# Patient Record
Sex: MALE | Race: WHITE | Employment: UNEMPLOYED | URBAN - METROPOLITAN AREA
[De-identification: names, ages, dates, MRNs, and addresses within clinical notes are randomized per-mention and may not be internally consistent; named-entity substitution may affect disease eponyms.]

---

## 2018-02-16 NOTE — TELEPHONE ENCOUNTER
APPT INFO    Date /Time: 3/7/18 10:20AM   Reason for Appt: Left leg stump pain   Ref Provider/Clinic: CARLI PEREZ/ [facility]   Are there internal records? Yes/No?  IF YES, list clinic names: NO   Are there outside records? Yes/No? YES - see below   Patient Contact (Y/N) & Call Details: NO - referral   Action: Faxed request      OUTSIDE RECORDS CHECKLIST     CLINIC NAME COMMENTS REC (x) IMG (x)   [facility]

## 2018-02-16 NOTE — TELEPHONE ENCOUNTER
Records Received From:  [facility]     DATE/EXAM/LOCATION  (specify location if different)   Office Notes: 1/19/18    [facility] notes: 8/20/17-9/7/17   Missing: - SHANELLE 9/3/17  - [facility] records/images

## 2018-03-07 ENCOUNTER — PRE VISIT (OUTPATIENT)
Dept: ANESTHESIOLOGY | Facility: CLINIC | Age: 58
End: 2018-03-07

## 2018-03-07 ENCOUNTER — OFFICE VISIT (OUTPATIENT)
Dept: ANESTHESIOLOGY | Facility: CLINIC | Age: 58
End: 2018-03-07
Payer: COMMERCIAL

## 2018-03-07 VITALS
SYSTOLIC BLOOD PRESSURE: 145 MMHG | BODY MASS INDEX: 28.88 KG/M2 | RESPIRATION RATE: 16 BRPM | HEIGHT: 74 IN | DIASTOLIC BLOOD PRESSURE: 94 MMHG | WEIGHT: 225 LBS | HEART RATE: 98 BPM

## 2018-03-07 DIAGNOSIS — M79.609 STUMP PAIN (H): Primary | ICD-10-CM

## 2018-03-07 DIAGNOSIS — T87.89 STUMP PAIN (H): Primary | ICD-10-CM

## 2018-03-07 RX ORDER — PREGABALIN 25 MG/1
25 CAPSULE ORAL 2 TIMES DAILY
Qty: 60 CAPSULE | Refills: 1 | Status: SHIPPED | OUTPATIENT
Start: 2018-03-07 | End: 2019-03-27

## 2018-03-07 ASSESSMENT — PAIN SCALES - GENERAL: PAINLEVEL: SEVERE PAIN (6)
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
## 2018-03-07 NOTE — MR AVS SNAPSHOT
After Visit Summary   3/7/2018    Jesus Presley    MRN: [id number]           Patient Information     Date Of Birth          1960        Visit Information        Provider Department      3/7/2018 10:20 AM Martine Townsend MD [facility] for Comprehensive Pain Management        Today's Diagnoses     Stump pain (H)    -  1      Care Instructions    1. Referral to orthopedics for evaluation of your stump.     2. Start taking lyrica 25mg twice daily following these instructions:    Week 1: 0mg in AM 25mg in PM  Week 2: 25mg in AM 25mg in PM    3. Continue taking cymbalta as prescribed.     4. Start taking ibuprofen 400mg every 6 hours as needed for pain.     Follow up: 3 months       To speak with a nurse, schedule/reschedule/cancel a clinic appointment, or request a medication refill call: [phone]     You can also reach us by [employer]: [email or web address]    For refills, please call on Monday, 1 week before your medication runs out. The doctors are not always in clinic, so this gives us time to get your prescriptions ready.  Please let us know the name of the medication you are requesting a refill of.                                     Follow-ups after your visit        Additional Services     ORTHOPEDICS ADULT REFERRAL       Evaluation of left BKA stump     Your provider has referred you to: [facility]: Orthopaedic Clinic [facility] [phone]   [email or web address]      Please be aware that coverage of these services is subject to the terms and limitations of your health insurance plan.  Call member services at your health plan with any benefit or coverage questions.      Please bring the following to your appointment:    >>   Any x-rays, CTs or MRIs which have been performed.  Contact the facility where they were done to arrange for  prior to your scheduled appointment.    >>   List of current medications   >>   This referral  "request   >>   Any documents/labs given to you for this referral                  Who to contact     Please call your clinic at [phone] to:    Ask questions about your health    Make or cancel appointments    Discuss your medicines    Learn about your test results    Speak to your doctor            Additional Information About Your Visit        [employer] Information     MyHeritage is an electronic gateway that provides easy, online access to your medical records. With MyHeritage, you can request a clinic appointment, read your test results, renew a prescription or communicate with your care team.     To sign up for MyHeritage visit the website at [email or web address]   You will be asked to enter the access code listed below, as well as some personal information. Please follow the directions to create your username and password.     Your access code is: [email or web address]  Expires: 2018  6:30 AM     Your access code will  in 90 days. If you need help or a new code, please contact your [facility] or call [phone] for assistance.        Care EveryWhere ID     This is your Care EveryWhere ID. This could be used by other organizations to access your [place] medical records  [id number]        Your Vitals Were     Pulse Respirations Height BMI (Body Mass Index)          98 16 1.88 m (6' 2\") 28.89 kg/m2         Blood Pressure from Last 3 Encounters:   18 (!) 145/94    Weight from Last 3 Encounters:   18 102.1 kg (225 lb)              We Performed the Following     ORTHOPEDICS ADULT REFERRAL          Today's Medication Changes          These changes are accurate as of 3/7/18 11:46 AM.  If you have any questions, ask your nurse or doctor.               Start taking these medicines.        Dose/Directions    pregabalin 25 MG capsule   Commonly known as:  LYRICA   Used for:  Stump pain (H)   Started by:  Martine Townsend MD        Dose:  25 mg   Take 1 capsule (25 mg) by " mouth 2 times daily   Quantity:  60 capsule   Refills:  1            Where to get your medicines      Some of these will need a paper prescription and others can be bought over the counter.  Ask your nurse if you have questions.     Bring a paper prescription for each of these medications     pregabalin 25 MG capsule                Primary Care Provider    None Specified       No primary provider on file.        Equal Access to Services     [place] : Hadii meredith ku hadayseo Soyohanaali, waaxda luqadaha, qaybta kaalmada hever, vincenzo demarcodominickwilliams [place]. So [facility] [phone].    ATENCIÓN: Si habla español, tiene a sweeney disposición servicios gratuitos de asistencia lingüística. [facility] [phone].    We comply with applicable federal civil rights laws and [place] laws. We do not discriminate on the basis of race, color, national origin, age, disability, sex, sexual orientation, or gender identity.            Thank you!     Thank you for choosing [facility] FOR COMPREHENSIVE PAIN MANAGEMENT  for your care. Our goal is always to provide you with excellent care. Hearing back from our patients is one way we can continue to improve our services. Please take a few minutes to complete the written survey that you may receive in the mail after your visit with us. Thank you!             Your Updated Medication List - Protect others around you: Learn how to safely use, store and throw away your medicines at www.disposemymeds.org.          This list is accurate as of 3/7/18 11:46 AM.  Always use your most recent med list.                   Brand Name Dispense Instructions for use Diagnosis    ADVIL PO      Take 600 mg by mouth every 8 hours as needed for moderate pain        CYMBALTA PO           EXCEDRIN MIGRAINE 250-250-65 MG per tablet   Generic drug:  aspirin-acetaminophen-caffeine      Take 1 tablet by mouth every 6 hours as needed for headaches        pregabalin 25 MG capsule    LYRICA    60  capsule    Take 1 capsule (25 mg) by mouth 2 times daily    Stump pain (H)

## 2018-03-07 NOTE — NURSING NOTE
AVS given and reviewed with pt.  Pt verbalized understanding and declined any questions.     Mona Gonzales, RN, BSN
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
## 2018-03-07 NOTE — PATIENT INSTRUCTIONS
1. Referral to orthopedics for evaluation of your stump.     2. Start taking lyrica 25mg twice daily following these instructions:    Week 1: 0mg in AM 25mg in PM  Week 2: 25mg in AM 25mg in PM    3. Continue taking cymbalta as prescribed.     4. Start taking ibuprofen 400mg every 6 hours as needed for pain.     Follow up: 3 months       To speak with a nurse, schedule/reschedule/cancel a clinic appointment, or request a medication refill call: [phone]     You can also reach us by [employer]: [email or web address]    For refills, please call on Monday, 1 week before your medication runs out. The doctors are not always in clinic, so this gives us time to get your prescriptions ready.  Please let us know the name of the medication you are requesting a refill of.

## 2018-03-08 ASSESSMENT — ENCOUNTER SYMPTOMS
GASTROINTESTINAL NEGATIVE: 1
MYALGIAS: 1
EYES NEGATIVE: 1
RESPIRATORY NEGATIVE: 1
TINGLING: 1
CONSTITUTIONAL NEGATIVE: 1
PSYCHIATRIC NEGATIVE: 1

## 2018-03-09 ENCOUNTER — TELEPHONE (OUTPATIENT)
Dept: ANESTHESIOLOGY | Facility: CLINIC | Age: 58
End: 2018-03-09

## 2018-03-09 NOTE — TELEPHONE ENCOUNTER
Pt updated with Dr. Townsend's injection recommendation and is agreeable with moving forward.  Pt asking about Dr. Townsend completing disability paperwork.  Pt advised to follow up with PCP or to call  to inquire about a provider to assist with this. Pt verbalized understanding.     Mona Gonzlaes, RN, BSN

## 2018-03-13 ENCOUNTER — CARE COORDINATION (OUTPATIENT)
Dept: ANESTHESIOLOGY | Facility: CLINIC | Age: 58
End: 2018-03-13

## 2018-03-13 DIAGNOSIS — T87.89 STUMP PAIN (H): Primary | ICD-10-CM

## 2018-03-13 DIAGNOSIS — M79.609 STUMP PAIN (H): Primary | ICD-10-CM

## 2018-03-14 ENCOUNTER — TELEPHONE (OUTPATIENT)
Dept: ANESTHESIOLOGY | Facility: CLINIC | Age: 58
End: 2018-03-14

## 2018-03-14 NOTE — TELEPHONE ENCOUNTER
I called Jesus to schedule his procedure.     I have him scheduled for his procedure with Dr. Townsend on 4/6/22018, follow up with Etelvina on 4/20/2018.     Jesus seemed confused about the details of the procedure, he did not understand why he needed to have a . He also had questions about what the procedure was.     I let him know that a nurse would call him to clarify his questions.    DISCHARGE

## 2018-03-14 NOTE — TELEPHONE ENCOUNTER
----- Message from Mona Gonzales RN sent at 3/13/2018  1:03 PM CDT -----  April,    Please call this pt to assist with scheduling with Dr. Townsend.  Orders are in.    Left stump neuroma injection  [phone]    Thanks!

## 2018-04-06 ENCOUNTER — TELEPHONE (OUTPATIENT)
Dept: ANESTHESIOLOGY | Facility: CLINIC | Age: 58
End: 2018-04-06

## 2018-04-06 ENCOUNTER — SURGERY (OUTPATIENT)
Age: 58
End: 2018-04-06

## 2018-04-06 ENCOUNTER — HOSPITAL ENCOUNTER (OUTPATIENT)
Facility: AMBULATORY SURGERY CENTER | Age: 58
End: 2018-04-06
Attending: ANESTHESIOLOGY
Payer: COMMERCIAL

## 2018-04-06 VITALS
HEART RATE: 92 BPM | SYSTOLIC BLOOD PRESSURE: 154 MMHG | BODY MASS INDEX: 28.88 KG/M2 | DIASTOLIC BLOOD PRESSURE: 97 MMHG | RESPIRATION RATE: 16 BRPM | OXYGEN SATURATION: 96 % | HEIGHT: 74 IN | TEMPERATURE: 97.2 F | WEIGHT: 225 LBS

## 2018-04-06 DIAGNOSIS — T87.89 STUMP PAIN (H): ICD-10-CM

## 2018-04-06 DIAGNOSIS — M79.609 PAINFUL AMPUTATION STUMP (H): Primary | ICD-10-CM

## 2018-04-06 DIAGNOSIS — T87.89 PAINFUL AMPUTATION STUMP (H): Primary | ICD-10-CM

## 2018-04-06 DIAGNOSIS — M79.609 STUMP PAIN (H): ICD-10-CM

## 2018-04-06 RX ORDER — BUPIVACAINE HYDROCHLORIDE 5 MG/ML
INJECTION, SOLUTION PERINEURAL PRN
Status: DISCONTINUED | OUTPATIENT
Start: 2018-04-06 | End: 2018-04-06 | Stop reason: HOSPADM

## 2018-04-06 RX ORDER — TRIAMCINOLONE ACETONIDE 40 MG/ML
INJECTION, SUSPENSION INTRA-ARTICULAR; INTRAMUSCULAR PRN
Status: DISCONTINUED | OUTPATIENT
Start: 2018-04-06 | End: 2018-04-06 | Stop reason: HOSPADM

## 2018-04-06 RX ORDER — LIDOCAINE HYDROCHLORIDE 10 MG/ML
INJECTION, SOLUTION EPIDURAL; INFILTRATION; INTRACAUDAL; PERINEURAL PRN
Status: DISCONTINUED | OUTPATIENT
Start: 2018-04-06 | End: 2018-04-06 | Stop reason: HOSPADM

## 2018-04-06 RX ADMIN — LIDOCAINE HYDROCHLORIDE 10 ML: 10 INJECTION, SOLUTION EPIDURAL; INFILTRATION; INTRACAUDAL; PERINEURAL at 11:50

## 2018-04-06 RX ADMIN — TRIAMCINOLONE ACETONIDE 40 MG: 40 INJECTION, SUSPENSION INTRA-ARTICULAR; INTRAMUSCULAR at 11:51

## 2018-04-06 RX ADMIN — BUPIVACAINE HYDROCHLORIDE 10 ML: 5 INJECTION, SOLUTION PERINEURAL at 11:49

## 2018-04-06 NOTE — TELEPHONE ENCOUNTER
[facility]    Phone Message    May a detailed message be left on voicemail: yes    Reason for Call: Other: FU what was discussed at appt.      Action Taken: Message routed to:  [facility] Clinic

## 2018-04-06 NOTE — DISCHARGE INSTRUCTIONS
Home Care Instructions after a Lower Extremity nerve block    In a lower extremity nerve block, local anesthetic or  numbing  medication is injected around the nerves to anesthetize or  numb  the leg or foot. When used for chronic pain, it is hoped that this procedure will interrupt the nerve pathways and provide long term pain relief. If your lower extremity is numb, you should be careful since it is possible to injure yourself without being aware of it. If your leg or foot is numb, you should:    Avoid striking or bumping your leg/foot    Avoid extreme hot or cold    Activity  -You may resume most normal activity levels with the exception of strenuous activity. It is important for us to know if your pain with normal activity is relieved after this injection.  -DO NOT shower for 24 hours  -DO NOT remove bandaid for 24 hours    Pain  -You may experience soreness at the injection site for one or two days  -You may use an ice pack for 20 minutes every 2 hours for the first 24 hours  -You may use a heating pad after the first 24 hours  -You may use Tylenol (acetaminophen) every 4 hours or other pain medicines as     directed by your physician      DID YOU RECEIVE SEDATION TODAY?  No    If you received sedation please follow these additional safety measures.  Sedation medicine, if given, may remain active for many hours. It is important for the next 24 hours that you do not:  -Drive a car  -Operate machines or power tools  -Consume alcohol, including beer  -Sign any important papers or legal documents    DID YOU RECEIVE STEROIDS TODAY?  Yes    Common side effects of steroids:  Not everyone will experience corticosteroid side effects. If side effects are experienced, they will gradually subside in the 7-10 day period following an injection. Most common side effects include:  -Flushed face and/or chest  -Feeling of warmth, particularly in the face but could be an overall feeling of warmth  -Increased blood sugar in  diabetic patients  -Menstrual irregularities my occur. If taking hormone-based birth control an alternate method of birth control is recommended  -Sleep disturbances and/or mood swings are possible  -Leg cramps      PLEASE KEEP TRACK OF YOUR SYMPTOMS AND NOTE YOUR IMPROVEMENT FOR YOUR DOCTOR.     Please contact us if you have:  -Severe pain  -Fever more than 101.5 degrees Fahrenheit  -Signs of infection at the injection site (redness, swelling, or drainage)    If you have questions, please contact our office at [phone] between the hours of 7:00 am and 3:00 pm Monday through Friday. After office hours you can contact the on call provider by dialing [phone]. If you need immediate attention, we recommend that you go to a hospital emergency room or dial 149.

## 2018-04-06 NOTE — IP AVS SNAPSHOT
[facility]    [address] [place] [id number]    Phone:  [phone]    Fax:  [phone]                                       After Visit Summary   4/6/2018    Jesus Presley    MRN: [id number]           After Visit Summary Signature Page     I have received my discharge instructions, and my questions have been answered. I have discussed any challenges I see with this plan with the nurse or doctor.    ..........................................................................................................................................  Patient/Patient Representative Signature      ..........................................................................................................................................  Patient Representative Print Name and Relationship to Patient    ..................................................               ................................................  Date                                            Time    ..........................................................................................................................................  Reviewed by Signature/Title    ...................................................              ..............................................  Date                                                            Time
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
## 2018-04-06 NOTE — OR NURSING
"Patient initially stated pain was gone when stood up from procedure table. On discharge, states he rates the pain 5/10. States the pain number is the same as before the procedure, but the pain is different; \"it's hurting where he put the needles in\".  "

## 2018-04-06 NOTE — IP AVS SNAPSHOT
MRN:[id number]                      After Visit Summary   4/6/2018    Jesus Presley    MRN: [id number]           Thank you!     Thank you for choosing [facility] for your care. Our goal is always to provide you with excellent care. Hearing back from our patients is one way we can continue to improve our services. Please take a few minutes to complete the written survey that you may receive in the mail after you visit with us. Thank you!        Patient Information     Date Of Birth          1960        About your hospital stay     You were admitted on:  April 6, 2018 You last received care in the:  [facility]    You were discharged on:  April 6, 2018       Who to Call     For medical emergencies, please call 911.  For non-urgent questions about your medical care, please call your primary care provider or clinic, None  For questions related to your surgery, please call your surgery clinic        Attending Provider     Provider Martine Lopes MD Anesthesiology       Primary Care Provider    None Specified      Your next 10 appointments already scheduled     Apr 20, 2018 10:20 AM CDT   (Arrive by 10:05 AM)   Return Visit with ZELALEM Rodriguez [facility] ([facility])    [address] [place] [id number] [phone]              Further instructions from your care team       Home Care Instructions after a Lower Extremity nerve block    In a lower extremity nerve block, local anesthetic or  numbing  medication is injected around the nerves to anesthetize or  numb  the leg or foot. When used for chronic pain, it is hoped that this procedure will interrupt the nerve pathways and provide long term pain relief. If your lower extremity is numb, you should be careful since it is possible to injure yourself without being aware of it. If your leg or foot is numb,  you should:    Avoid striking or bumping your leg/foot    Avoid extreme hot or cold    Activity  -You may resume most normal activity levels with the exception of strenuous activity. It is important for us to know if your pain with normal activity is relieved after this injection.  -DO NOT shower for 24 hours  -DO NOT remove bandaid for 24 hours    Pain  -You may experience soreness at the injection site for one or two days  -You may use an ice pack for 20 minutes every 2 hours for the first 24 hours  -You may use a heating pad after the first 24 hours  -You may use Tylenol (acetaminophen) every 4 hours or other pain medicines as     directed by your physician      DID YOU RECEIVE SEDATION TODAY?  No    If you received sedation please follow these additional safety measures.  Sedation medicine, if given, may remain active for many hours. It is important for the next 24 hours that you do not:  -Drive a car  -Operate machines or power tools  -Consume alcohol, including beer  -Sign any important papers or legal documents    DID YOU RECEIVE STEROIDS TODAY?  Yes    Common side effects of steroids:  Not everyone will experience corticosteroid side effects. If side effects are experienced, they will gradually subside in the 7-10 day period following an injection. Most common side effects include:  -Flushed face and/or chest  -Feeling of warmth, particularly in the face but could be an overall feeling of warmth  -Increased blood sugar in diabetic patients  -Menstrual irregularities my occur. If taking hormone-based birth control an alternate method of birth control is recommended  -Sleep disturbances and/or mood swings are possible  -Leg cramps      PLEASE KEEP TRACK OF YOUR SYMPTOMS AND NOTE YOUR IMPROVEMENT FOR YOUR DOCTOR.     Please contact us if you have:  -Severe pain  -Fever more than 101.5 degrees Fahrenheit  -Signs of infection at the injection site (redness, swelling, or drainage)    If you have questions, please  "contact our office at [phone] between the hours of 7:00 am and 3:00 pm Monday through Friday. After office hours you can contact the on call provider by dialing [phone]. If you need immediate attention, we recommend that you go to a hospital emergency room or dial 911.          Pending Results     No orders found from 2018 to 2018.            Admission Information     Date & Time Provider Department Dept. Phone    2018 Martine Townsend MD [facility] [phone]      Your Vitals Were     Blood Pressure Pulse Temperature Respirations Height Weight    143/90 92 97.2  F (36.2  C) (Temporal) 16 1.88 m (6' 2\") 102.1 kg (225 lb)    Pulse Oximetry BMI (Body Mass Index)                96% 28.89 kg/m2          [employer] Information     [employer] is an electronic gateway that provides easy, online access to your medical records. With [employer], you can request a clinic appointment, read your test results, renew a prescription or communicate with your care team.     To sign up for [employer] visit the website at [email or web address]   You will be asked to enter the access code listed below, as well as some personal information. Please follow the directions to create your username and password.     Your access code is: [email or web address]  Expires: 2018  7:30 AM     Your access code will  in 90 days. If you need help or a new code, please contact your [facility] or call [phone] for assistance.        Care EveryWhere ID     This is your Care EveryWhere ID. This could be used by other organizations to access your [place] medical records  [id number]        Equal Access to Services     PAUL GROVE AH: Hadkd Mary, wajohnda luqadaha, qaybta kaalmada hever, vincenzo browne. So [facility] [phone].    ATENCIÓN: Si habla español, tiene a sweeney disposición servicios gratuitos de asistencia lingüística. Llame " al [phone].    We comply with applicable federal civil rights laws and [place] laws. We do not discriminate on the basis of race, color, national origin, age, disability, sex, sexual orientation, or gender identity.               Review of your medicines      UNREVIEWED medicines. Ask your doctor about these medicines        Dose / Directions    ADVIL PO        Dose:  600 mg   Take 600 mg by mouth every 8 hours as needed for moderate pain   Refills:  0       CYMBALTA PO   Indication:  Patient doesn't know dosage.        Refills:  0       EXCEDRIN MIGRAINE 250-250-65 MG per tablet   Generic drug:  aspirin-acetaminophen-caffeine        Dose:  1 tablet   Take 1 tablet by mouth every 6 hours as needed for headaches   Refills:  0       pregabalin 25 MG capsule   Commonly known as:  LYRICA   Used for:  Stump pain (H)        Dose:  25 mg   Take 1 capsule (25 mg) by mouth 2 times daily   Quantity:  60 capsule   Refills:  1                Protect others around you: Learn how to safely use, store and throw away your medicines at www.disposemymeds.org.             Medication List: This is a list of all your medications and when to take them. Check marks below indicate your daily home schedule. Keep this list as a reference.      Medications           Morning Afternoon Evening Bedtime As Needed    ADVIL PO   Take 600 mg by mouth every 8 hours as needed for moderate pain                                CYMBALTA PO                                EXCEDRIN MIGRAINE 250-250-65 MG per tablet   Take 1 tablet by mouth every 6 hours as needed for headaches   Generic drug:  aspirin-acetaminophen-caffeine                                pregabalin 25 MG capsule   Commonly known as:  LYRICA   Take 1 capsule (25 mg) by mouth 2 times daily

## 2018-04-09 NOTE — TELEPHONE ENCOUNTER
Call back to pt.  Verbal orders given by Dr. Townsend for TENs unit.  Pt notified that DME order will pt mailed to him.  Pt instructed that order may be taken to any medical supply store to obtain the device.  Pt verbalized understanding.     Mona Gonzales, RN, BSN

## 2018-04-09 NOTE — TELEPHONE ENCOUNTER
[facility] Call Center    Phone Message    May a detailed message be left on voicemail: yes    Reason for Call: Other: Pt would like to discuss Rx for electric stimulator, and other F/U questions.     Action Taken: Message routed to:  [facility] ([facility] PAIN ADULT

## 2018-04-09 NOTE — TELEPHONE ENCOUNTER
[facility] Call Center    Phone Message    May a detailed message be left on voicemail: no    Reason for Call: Other: This is pt's 3rd call, he is trying to have Mona call him back in regards to the stimulator that Dr. Townsend ordered for him.  Please call the pt back     Action Taken: Message routed to:  [facility] Pain

## 2018-04-20 ENCOUNTER — OFFICE VISIT (OUTPATIENT)
Dept: ANESTHESIOLOGY | Facility: CLINIC | Age: 58
End: 2018-04-20
Payer: COMMERCIAL

## 2018-04-20 VITALS
SYSTOLIC BLOOD PRESSURE: 150 MMHG | WEIGHT: 225 LBS | DIASTOLIC BLOOD PRESSURE: 108 MMHG | RESPIRATION RATE: 16 BRPM | HEART RATE: 92 BPM | BODY MASS INDEX: 28.88 KG/M2 | HEIGHT: 74 IN

## 2018-04-20 DIAGNOSIS — M79.605 PAIN OF AMPUTATION STUMP OF LEFT LOWER EXTREMITY (H): ICD-10-CM

## 2018-04-20 DIAGNOSIS — M79.2 NEUROPATHIC PAIN OF LEFT LOWER EXTREMITY: Primary | ICD-10-CM

## 2018-04-20 DIAGNOSIS — T87.89 PAIN OF AMPUTATION STUMP OF LEFT LOWER EXTREMITY (H): ICD-10-CM

## 2018-04-20 ASSESSMENT — PAIN SCALES - GENERAL: PAINLEVEL: MILD PAIN (3)

## 2018-04-20 NOTE — MR AVS SNAPSHOT
After Visit Summary   4/20/2018    Jesus Presley    MRN: [id number]           Patient Information     Date Of Birth          1960        Visit Information        Provider Department      4/20/2018 10:20 AM Etelvina Presley APRN [facility] Comprehensive Pain Management        Care Instructions    1. Call to schedule your Ablation with Dr. Townsend.   We will send the prior authorization to your insurance company, and contact you if there are any issues.     Please call [phone] to make your procedure appointment.  Phones are answered Monday - Friday from 7:30 - 4:00pm.  Leave a voicemail with your name, birth date, and phone number if no one is available to take your call.     Your procedure: Radiofrequency ablation of the Neuroma.     On the day of the procedure  1. Arrive 1 hour earlier than your scheduled time, to the [facility]  Address: [address] [place] [id number]  2. Check in on the 5th floor for your procedure      You will need to follow up in clinic 4-6 weeks after your procedure.    If you must reschedule your procedure more than two times, you must follow up in clinic before rescheduling again.      Patient Pre-Procedure Instructions    CAUTION - FAILURE TO FOLLOW THESE PRE-PROCEDURE INSTRUCTIONS WILL RESULT IN YOUR PROCEDURE BEING RESCHEDULED.      Pregnancy  If you are pregnant, or think that you may be pregnant, please notify our staff. This may or may not affect the ability to perform the procedure.     You MUST have a  TO TAKE YOU HOME after your procedure. Transportation by Taxi or Para-transit must have a responsible adult accompany you home (other than the ). Travel by bus or light rail is not acceptable transportation. You must provide your 's full name and contact number at time of check in.   Fasting Protocol You may have NOTHING SOLID TO EAT FOR 8 HOURS prior to arrival at the procedure area.    Broth and candy are considered solid food and require an eight hour fast.   You may have CLEAR LIQUIDS UP TO 2 HOURS prior to arrival at the clinic.   Clear liquids include water, clear fruit juice (no pulp) carbonated beverages, ice, black coffee, black tea (no milk or cream), chewing gum (un-swallowed), and/or clear jello (no fruit or milk). No alcohol containing beverages.   Medications If you take any medications,  DO NOT STOP. Take your medications as usual the morning of your procedure with a sip of water AT LEAST 2 HOURS PRIOR TO ARRIVAL.    Antibiotics If you are currently taking antibiotics, you must complete the entire dose 7 days prior to your scheduled procedure. You must be clear of any signs or symptoms of infection. If you begin antibiotics, please contact our clinic for instructions.   Fever, Chills, or Rash If you experience a fever of higher than 100 degrees, chills, rash, or open wounds during the one week prior to your procedure, please call the clinic.         Medication Hold List  **Patients under Cardiology/Neurology care should consult their provider prior to the pain procedure to verify pre-procedure medication instructions. The information below contains general guidelines.**    Blood Thinners If you are taking daily ASPIRIN, PLAVIX, OR OTHER BLOOD THINNERS SUCH AS COUMADIN/WARFARIN, we will need your prescribing doctor to sign a release permitting you to stop these medications. Once approved by your prescribing doctor - STOP ALL BLOOD THINNERS BASED ON THE TIME TABLE BELOW PRIOR TO YOUR PROCEDURE. If you have been instructed to stop WARFARIN(COUMADIN), you must have an INR lab drawn the day before your procedure. . Your INR must be within normal limits before we can perform your injection. MEDICATIONS CAN BE RESTARTED AFTER YOUR PROCEDURE.    14 DAY HOLD  Ticlid (ticlopidine)    10 DAY HOLD  Effient (Prasugel)    3 DAY HOLD  Xarelto (rivaroxaban) 7 DAY HOLD  Anacin, Bufferin, Ecotrin,  Excedrin, Aggrenox (Aspirin)  Brilinta (ticagrelor)  Coumadin (Warfarin)  Pradexa (Dabigatran)  Elmiron (Pentosan)  Plavix (Clopidogrel Bisulfate)  Pletal (Cilostazol)    24 HOUR HOLD  Lovenox (enoxaparin)  Agrylin (Anagrelide)        Non-steroidal Anti-inflammatories (NSAIDs) DO NOT TAKE any non-steroidal anti-inflammatory medications (NSAIDs) listed on the table below. MEDICATIONS CAN BE RESTARTED AFTER YOUR PROCEDURE. Celebrex is OK to take and does not need to be discontinued.     Medications to stop:  3 DAY HOLD  Advil, Motrin (Ibuprofen)  Arthrotec (diciofenac sodium/misoprostol)  Clinoril (Sulindac)  Indocin (Indomethacin)  Lodine (Etodolac)  Toradol (Ketorolac)  Vicoprofen (Hydrocodone and Ibuprofen)  Voltaren (Diclotenac)    14 DAY HOLD  Daypro (Oxaprozin)  Feldene (Piroxicam)   7 DAY HOLD  Aleve (Naproxen sodium)  Darvon compound (contains aspirin)  Naprosyn (Naproxen)  Norgesic Forte (contains aspirin)  Mobic (Meloxicam)  Oruvall (Ketoprofen)  Percodan (contains aspirin)  Relafen (Nabumetone)  Salsalate  Trilisate  Vitamin E (more than 400 mg per day)  Any medication containing aspirin                To speak with a nurse, schedule/reschedule/cancel a clinic appointment, or request a medication refill call: [phone]     You can also reach us by DediServe: [email or web address]    For refills, please call on Monday, 1 week before your medication runs out. The doctors are not always in clinic, so this gives us time to get your prescriptions ready.  Please let us know the name of the medication you are requesting a refill of.                                     Follow-ups after your visit        Who to contact     Please call your clinic at [phone] to:    Ask questions about your health    Make or cancel appointments    Discuss your medicines    Learn about your test results    Speak to your doctor            Additional Information About Your Visit        GlobalServehart Information      "VtagOt is an electronic gateway that provides easy, online access to your medical records. With [employer], you can request a clinic appointment, read your test results, renew a prescription or communicate with your care team.     To sign up for [employer] visit the website at [email or web address]   You will be asked to enter the access code listed below, as well as some personal information. Please follow the directions to create your username and password.     Your access code is: [email or web address]  Expires: 2018  7:30 AM     Your access code will  in 90 days. If you need help or a new code, please contact your [facility] or call [phone] for assistance.        Care EveryWhere ID     This is your Care EveryWhere ID. This could be used by other organizations to access your [place] medical records  [id number]        Your Vitals Were     Pulse Respirations Height BMI (Body Mass Index)          92 16 1.88 m (6' 2\") 28.89 kg/m2         Blood Pressure from Last 3 Encounters:   18 (!) 150/108   18 (!) 154/97   18 (!) 145/94    Weight from Last 3 Encounters:   18 102.1 kg (225 lb)   18 102.1 kg (225 lb)   18 102.1 kg (225 lb)              Today, you had the following     No orders found for display       Primary Care Provider    None Specified       No primary provider on file.        Equal Access to Services     [facility] : Hadii meredith legero Solarry, waaxda luqadaha, qaybta kaalmada vincenzo kathleen . So [facility] [phone].    ATENCIÓN: Si habla español, tiene a sweeney disposición servicios gratuitos de asistencia lingüística. Llame al [phone].    We comply with applicable federal civil rights laws and [place] laws. We do not discriminate on the basis of race, color, national origin, age, disability, sex, sexual orientation, or gender identity.            Thank you!     Thank you for choosing [facility] " [facility]  for your care. Our goal is always to provide you with excellent care. Hearing back from our patients is one way we can continue to improve our services. Please take a few minutes to complete the written survey that you may receive in the mail after your visit with us. Thank you!             Your Updated Medication List - Protect others around you: Learn how to safely use, store and throw away your medicines at www.disposemymeds.org.          This list is accurate as of 4/20/18 11:02 AM.  Always use your most recent med list.                   Brand Name Dispense Instructions for use Diagnosis    ADVIL PO      Take 600 mg by mouth every 8 hours as needed for moderate pain        CYMBALTA PO           EXCEDRIN MIGRAINE 250-250-65 MG per tablet   Generic drug:  aspirin-acetaminophen-caffeine      Take 1 tablet by mouth every 6 hours as needed for headaches        order for DME     1 Device    Equipment being ordered: TENS    Painful amputation stump (H), Stump pain (H)       pregabalin 25 MG capsule    LYRICA    60 capsule    Take 1 capsule (25 mg) by mouth 2 times daily    Stump pain (H)

## 2018-04-20 NOTE — PROGRESS NOTES
Date of visit: 4/20/2018    Chief complaint:   Chief Complaint   Patient presents with     Pain Management     Follow up        Interval history:  Jesus Presley is a 57 year old male last seen by my colleague, Dr. Martine Townsend on 3/7/18 for stump pain at the site of his left BKA amputation. He underwent dx ultrasound-guided left BKA stump neuroma injections on 4/6/18, reporting definite pain relief. He has a significant difficulty quantifying this, but finally is able to communicate at least 50% relief. He would like to proceed with ablative procedure as previously discussed.   He states he is not taking his Lyrica regularly due to the cost; gabapentin caused him fatigue and somnolence.      Recommendations/plan at the last visit included:  1. Medications.      a.  Continue Cymbalta 30 mg nightly.  Dose may be increased to 60 mg nightly  B.  Pregabalin 25 mg nightly.  Dose may be increased to 25 mg twice daily if the patient tolerates well  C.  Start taking ibuprofen 400 mg every 6 hours as needed.     2. Interventional procedures:     We discussed with the patient about diagnostic ultrasound-guided left BKA stump neuroma injections, the patient agreed to pursue. We will schedule the patient for the injections  pending insurance approval.  The patient gets good pain relief with a diagnostic injections, we may proceed to radiofrequency ablation of the neuroma under ultrasound guidance.     3. Labs and imaging: None needed for pain management.      4. Rehab: Advised to perform home exercise using Evver videos  [email or web address].  The patient is also encouraged to stay active as tolerated.      5. Psychology: No current needs.     6. Integrated medicine: We discussed acupuncture therapy.  The patient is not interested      7. Disposition:  The patient is advised to call clinic for follow up appointment in 3 months or earlier clinically indicated. We will see the patient for above  "mentioned procedure.       Pain scores:  Pain intensity on average is 3 on a scale of 0-10.         Medications:  Current Outpatient Prescriptions   Medication Sig Dispense Refill     aspirin-acetaminophen-caffeine (EXCEDRIN MIGRAINE) 250-250-65 MG per tablet Take 1 tablet by mouth every 6 hours as needed for headaches       DULoxetine HCl (CYMBALTA PO)        Ibuprofen (ADVIL PO) Take 600 mg by mouth every 8 hours as needed for moderate pain       order for DME Equipment being ordered: TENS 1 Device 0     pregabalin (LYRICA) 25 MG capsule Take 1 capsule (25 mg) by mouth 2 times daily 60 capsule 1       Medical History: any changes in medical history since they were last seen? No    Review of Systems:  The 14 system ROS was reviewed from the intake questionnaire; results listed at end of note.     Physical Exam:  Blood pressure (!) 150/108, pulse 92, resp. rate 16, height 1.88 m (6' 2\"), weight 102.1 kg (225 lb).  General: NAD  Gait: Antalgic  MSK exam: Left below-knee amputation stump well-healed.  No redness swelling or open wound noted.    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    Plan:  1. Interventions:   -Order placed for radiofrequency ablation of left BKA stump neuroma; procedure explained in depth. To schedule with Dr. Townsend.   2. Follow up: Return to clinic 4-6 weeks following RFA procedure.   3. Medications:  -Advised patient of the importance of remaining on his Lyrica as prescribed; if this is cost-prohibitive, may need to look into alternate options.     Total time spent was 20 minutes, and more than 50% of face to face time was spent in counseling and/or coordination of care regarding plan of care.    ZELALEM Rodriguez, SAL-BC  Pain Management  Department of Interventional Pain Management and Anesthesiology   [facility]        "

## 2018-04-20 NOTE — PATIENT INSTRUCTIONS
1. Call to schedule your Ablation with Dr. Townsend.   We will send the prior authorization to your insurance company, and contact you if there are any issues.     Please call [phone] to make your procedure appointment.  Phones are answered Monday - Friday from 7:30 - 4:00pm.  Leave a voicemail with your name, birth date, and phone number if no one is available to take your call.     Your procedure: Radiofrequency ablation of the Neuroma.     On the day of the procedure  1. Arrive 1 hour earlier than your scheduled time, to the [facility]  Address: [address] [place] [address]  2. Check in on the 5th floor for your procedure      You will need to follow up in clinic 4-6 weeks after your procedure.    If you must reschedule your procedure more than two times, you must follow up in clinic before rescheduling again.      Patient Pre-Procedure Instructions    CAUTION - FAILURE TO FOLLOW THESE PRE-PROCEDURE INSTRUCTIONS WILL RESULT IN YOUR PROCEDURE BEING RESCHEDULED.      Pregnancy  If you are pregnant, or think that you may be pregnant, please notify our staff. This may or may not affect the ability to perform the procedure.     You MUST have a  TO TAKE YOU HOME after your procedure. Transportation by Taxi or Para-transit must have a responsible adult accompany you home (other than the ). Travel by bus or light rail is not acceptable transportation. You must provide your 's full name and contact number at time of check in.   Fasting Protocol You may have NOTHING SOLID TO EAT FOR 8 HOURS prior to arrival at the procedure area.   Broth and candy are considered solid food and require an eight hour fast.   You may have CLEAR LIQUIDS UP TO 2 HOURS prior to arrival at the clinic.   Clear liquids include water, clear fruit juice (no pulp) carbonated beverages, ice, black coffee, black tea (no milk or cream), chewing gum (un-swallowed), and/or clear jello (no fruit  or milk). No alcohol containing beverages.   Medications If you take any medications,  DO NOT STOP. Take your medications as usual the morning of your procedure with a sip of water AT LEAST 2 HOURS PRIOR TO ARRIVAL.    Antibiotics If you are currently taking antibiotics, you must complete the entire dose 7 days prior to your scheduled procedure. You must be clear of any signs or symptoms of infection. If you begin antibiotics, please contact our clinic for instructions.   Fever, Chills, or Rash If you experience a fever of higher than 100 degrees, chills, rash, or open wounds during the one week prior to your procedure, please call the clinic.         Medication Hold List  **Patients under Cardiology/Neurology care should consult their provider prior to the pain procedure to verify pre-procedure medication instructions. The information below contains general guidelines.**    Blood Thinners If you are taking daily ASPIRIN, PLAVIX, OR OTHER BLOOD THINNERS SUCH AS COUMADIN/WARFARIN, we will need your prescribing doctor to sign a release permitting you to stop these medications. Once approved by your prescribing doctor - STOP ALL BLOOD THINNERS BASED ON THE TIME TABLE BELOW PRIOR TO YOUR PROCEDURE. If you have been instructed to stop WARFARIN(COUMADIN), you must have an INR lab drawn the day before your procedure. . Your INR must be within normal limits before we can perform your injection. MEDICATIONS CAN BE RESTARTED AFTER YOUR PROCEDURE.    14 DAY HOLD  Ticlid (ticlopidine)    10 DAY HOLD  Effient (Prasugel)    3 DAY HOLD  Xarelto (rivaroxaban) 7 DAY HOLD  Anacin, Bufferin, Ecotrin, Excedrin, Aggrenox (Aspirin)  Brilinta (ticagrelor)  Coumadin (Warfarin)  Pradexa (Dabigatran)  Elmiron (Pentosan)  Plavix (Clopidogrel Bisulfate)  Pletal (Cilostazol)    24 HOUR HOLD  Lovenox (enoxaparin)  Agrylin (Anagrelide)        Non-steroidal Anti-inflammatories (NSAIDs) DO NOT TAKE any non-steroidal anti-inflammatory medications  (NSAIDs) listed on the table below. MEDICATIONS CAN BE RESTARTED AFTER YOUR PROCEDURE. Celebrex is OK to take and does not need to be discontinued.     Medications to stop:  3 DAY HOLD  Advil, Motrin (Ibuprofen)  Arthrotec (diciofenac sodium/misoprostol)  Clinoril (Sulindac)  Indocin (Indomethacin)  Lodine (Etodolac)  Toradol (Ketorolac)  Vicoprofen (Hydrocodone and Ibuprofen)  Voltaren (Diclotenac)    14 DAY HOLD  Daypro (Oxaprozin)  Feldene (Piroxicam)   7 DAY HOLD  Aleve (Naproxen sodium)  Darvon compound (contains aspirin)  Naprosyn (Naproxen)  Norgesic Forte (contains aspirin)  Mobic (Meloxicam)  Oruvall (Ketoprofen)  Percodan (contains aspirin)  Relafen (Nabumetone)  Salsalate  Trilisate  Vitamin E (more than 400 mg per day)  Any medication containing aspirin                To speak with a nurse, schedule/reschedule/cancel a clinic appointment, or request a medication refill call: [phone]     You can also reach us by [employer]: [email or web address]    For refills, please call on Monday, 1 week before your medication runs out. The doctors are not always in clinic, so this gives us time to get your prescriptions ready.  Please let us know the name of the medication you are requesting a refill of.

## 2018-04-20 NOTE — LETTER
4/20/2018       RE: Jesus Presley  [address] [place] [address]     Dear Colleague,    Thank you for referring your patient, Jesus Presley, to the [facility] COM[facility]ENSIVE PA[facility] MANAGEMENT at [facility]. Please see a copy of my visit note below.    Date of visit: 4/20/2018    Chief complaint:   Chief Complaint   Patient presents with     Pain Management     Follow up        Interval history:  Jesus Presley is a 57 year old male last seen by my colleague, Dr. Martine Townsend on 3/7/18 for stump pain at the site of his left BKA amputation. He underwent dx ultrasound-guided left BKA stump neuroma injections on 4/6/18, reporting definite pain relief. He has a significant difficulty quantifying this, but finally is able to communicate at least 50% relief. He would like to proceed with ablative procedure as previously discussed.   He states he is not taking his Lyrica regularly due to the cost; gabapentin caused him fatigue and somnolence.      Recommendations/plan at the last visit included:  1. Medications.      a.  Continue Cymbalta 30 mg nightly.  Dose may be increased to 60 mg nightly  B.  Pregabalin 25 mg nightly.  Dose may be increased to 25 mg twice daily if the patient tolerates well  C.  Start taking ibuprofen 400 mg every 6 hours as needed.     2. Interventional procedures:     We discussed with the patient about diagnostic ultrasound-guided left BKA stump neuroma injections, the patient agreed to pursue. We will schedule the patient for the injections  pending insurance approval.  The patient gets good pain relief with a diagnostic injections, we may proceed to radiofrequency ablation of the neuroma under ultrasound guidance.     3. Labs and imaging: None needed for pain management.      4. Rehab: Advised to perform home exercise using [employer]Hobo videos  [email or web address].  The patient is also encouraged to stay active as  "tolerated.      5. Psychology: No current needs.     6. Integrated medicine: We discussed acupuncture therapy.  The patient is not interested      7. Disposition:  The patient is advised to call clinic for follow up appointment in 3 months or earlier clinically indicated. We will see the patient for above mentioned procedure.       Pain scores:  Pain intensity on average is 3 on a scale of 0-10.         Medications:  Current Outpatient Prescriptions   Medication Sig Dispense Refill     aspirin-acetaminophen-caffeine (EXCEDRIN MIGRAINE) 250-250-65 MG per tablet Take 1 tablet by mouth every 6 hours as needed for headaches       DULoxetine HCl (CYMBALTA PO)        Ibuprofen (ADVIL PO) Take 600 mg by mouth every 8 hours as needed for moderate pain       order for DME Equipment being ordered: TENS 1 Device 0     pregabalin (LYRICA) 25 MG capsule Take 1 capsule (25 mg) by mouth 2 times daily 60 capsule 1       Medical History: any changes in medical history since they were last seen? No    Review of Systems:  The 14 system ROS was reviewed from the intake questionnaire; results listed at end of note.     Physical Exam:  Blood pressure (!) 150/108, pulse 92, resp. rate 16, height 1.88 m (6' 2\"), weight 102.1 kg (225 lb).  General: NAD  Gait: Antalgic  MSK exam: Left below-knee amputation stump well-healed.  No redness swelling or open wound noted.    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    Plan:  1. Interventions:   -Order placed for radiofrequency ablation of left BKA stump neuroma; procedure explained in depth. To schedule with Dr. Townsend.   2. Follow up: Return to clinic 4-6 weeks following RFA procedure.   3. Medications:  -Advised patient of the importance of remaining on his Lyrica as prescribed; if this is cost-prohibitive, may need to look into alternate options.     Total time spent was 20 minutes, and more " than 50% of face to face time was spent in counseling and/or coordination of care regarding plan of care.    ZELALEM Rodriguez, SAL-BC  Pain Management  Department of Interventional Pain Management and Anesthesiology   [facility]

## 2018-04-23 ENCOUNTER — TELEPHONE (OUTPATIENT)
Dept: ANESTHESIOLOGY | Facility: CLINIC | Age: 58
End: 2018-04-23

## 2018-04-23 NOTE — TELEPHONE ENCOUNTER
Jesus called today to schedule his procedure with Dr. Townsend.     I have him scheduled on 6/1/2018. He would like to be on the wait list.     He is aware that he needs to have a .     I scheduled his f/u with Etelvina. I will call him and let him know the date and time when he can write it down.

## 2018-06-01 ENCOUNTER — SURGERY (OUTPATIENT)
Age: 58
End: 2018-06-01

## 2018-06-01 ENCOUNTER — HOSPITAL ENCOUNTER (OUTPATIENT)
Facility: AMBULATORY SURGERY CENTER | Age: 58
End: 2018-06-01
Attending: ANESTHESIOLOGY
Payer: COMMERCIAL

## 2018-06-01 VITALS
WEIGHT: 215 LBS | RESPIRATION RATE: 14 BRPM | SYSTOLIC BLOOD PRESSURE: 136 MMHG | OXYGEN SATURATION: 96 % | BODY MASS INDEX: 28.49 KG/M2 | HEIGHT: 73 IN | HEART RATE: 58 BPM | TEMPERATURE: 97.9 F | DIASTOLIC BLOOD PRESSURE: 82 MMHG

## 2018-06-01 RX ORDER — BUPIVACAINE HYDROCHLORIDE 5 MG/ML
INJECTION, SOLUTION PERINEURAL PRN
Status: DISCONTINUED | OUTPATIENT
Start: 2018-06-01 | End: 2018-06-01 | Stop reason: HOSPADM

## 2018-06-01 RX ORDER — LIDOCAINE HYDROCHLORIDE 10 MG/ML
INJECTION, SOLUTION EPIDURAL; INFILTRATION; INTRACAUDAL; PERINEURAL PRN
Status: DISCONTINUED | OUTPATIENT
Start: 2018-06-01 | End: 2018-06-01 | Stop reason: HOSPADM

## 2018-06-01 RX ORDER — TRIAMCINOLONE ACETONIDE 40 MG/ML
INJECTION, SUSPENSION INTRA-ARTICULAR; INTRAMUSCULAR PRN
Status: DISCONTINUED | OUTPATIENT
Start: 2018-06-01 | End: 2018-06-01 | Stop reason: HOSPADM

## 2018-06-01 RX ORDER — FENTANYL CITRATE 50 UG/ML
INJECTION, SOLUTION INTRAMUSCULAR; INTRAVENOUS PRN
Status: DISCONTINUED | OUTPATIENT
Start: 2018-06-01 | End: 2018-06-01 | Stop reason: HOSPADM

## 2018-06-01 RX ADMIN — TRIAMCINOLONE ACETONIDE 80 MG: 40 INJECTION, SUSPENSION INTRA-ARTICULAR; INTRAMUSCULAR at 09:46

## 2018-06-01 RX ADMIN — FENTANYL CITRATE 100 MCG: 50 INJECTION, SOLUTION INTRAMUSCULAR; INTRAVENOUS at 09:20

## 2018-06-01 RX ADMIN — BUPIVACAINE HYDROCHLORIDE 8 ML: 5 INJECTION, SOLUTION PERINEURAL at 09:45

## 2018-06-01 RX ADMIN — LIDOCAINE HYDROCHLORIDE 16 ML: 10 INJECTION, SOLUTION EPIDURAL; INFILTRATION; INTRACAUDAL; PERINEURAL at 09:44

## 2018-06-01 RX ADMIN — FENTANYL CITRATE 50 MCG: 50 INJECTION, SOLUTION INTRAMUSCULAR; INTRAVENOUS at 09:30

## 2018-06-01 NOTE — DISCHARGE INSTRUCTIONS
Home Care Instructions after a Radio Frequency Ablation    A radiofrequency ablation refers to a procedure that destroys the functionality of the nerve using radiofrequency energy. The physician uses x-ray guidance (fluoroscopy) to direct a special (radiofrequency) needle alongside the sacroiloac join. A small amount of electrical current is often carefully passed through the needle to assure it is next to the target nerve and a safe distance from other nerves. The targeted nerves will then be numbed to minimize pain while the lesion is being created. The radiofrequency waves are introduced to heat the tip of the needle and a heat lesion is created on the nerve to disrupt the nerve's ability to send pain signals. Please follow the aftercare instructions regarding your procedure.    Activity  -Rest today  -Do not work today  -Resume normal activity in 2-3 days  -No heavy lifting, turning or twisting for 24 hours  -DO NOT shower or soak in tub for 24 hours  -DO NOT remove bandaid for 24 hours    Pain  -You may experience soreness at the injection site for one or two days  -You may use an ice pack for 20 minutes every 2 hours for the first 24 hours, longer if needed for comfort, do not use heat  -You may use Tylenol (acetaminophen) every 4 hours or other pain medicines as     directed by your physician  -If other pain medications are prescribed by your physician, please follow dosing instructions carefully.    What to expect  You may experience numbness radiating into your legs or arms (depending on the procedure location). This numbness may last several hours. Until sensation returns to normal, please use caution in walking, climbing stairs, and stepping out of your vehicle, etc. Relief of your initial symptoms can take up to 4 weeks to feel better, this is normal and due to the healing process. The procedure site may feel like a deep burn. Using ice will greatly minimize this discomfort. Do not use numbing creams or  patches over your injection sites immediately following your procedure. Please keep injection sites covered, clean and dry for 24 hours.    DID YOU RECEIVE SEDATION TODAY?  Yes    Safety  Sedation medicine, if given, may remain active for many hours. It is important for the next 24 hours that you do not:  -Drive a car  -Operate machines or power tools  -Consume alcohol, including beer  -Sign any important papers or legal documents  -Please have a responsible adult with you for 24 hours following any sedation    DID YOU RECEIVE STEROIDS TODAY?  Yes    Common side effects of steroids:  Not everyone will experience corticosteroid side effects. If side effects are experienced, they will gradually subside in the 7-10 day period following an injection. Most common side effects include:  -Flushed face and/or chest  -Feeling of warmth, particularly in the face but could be an overall feeling of warmth  -Increased blood sugar in diabetic patients  -Menstrual irregularities my occur. If taking hormone-based birth control an alternate method of birth control is recommended  -Sleep disturbances and/or mood swings are possible  -Leg cramps      Please contact us if you have:  -Severe pain  -Fever more than 101.5 degrees Fahrenheit  -Signs of infection at the injection site (redness, swelling, or drainage)    If you have questions, please contact our office at [phone] between the hours of 7:00 am and 3:00 pm Monday through Friday. After office hours you can contact the on call provider by dialing [phone]. If you need immediate attention, we recommend that you go to a hospital emergency room or dial [phone].

## 2018-06-01 NOTE — IP AVS SNAPSHOT
[facility]    [address] [place] [id number]    Phone:  [phone]    Fax:  [phone]                                       After Visit Summary   6/1/2018    Jesus Presley    MRN: [id number]           After Visit Summary Signature Page     I have received my discharge instructions, and my questions have been answered. I have discussed any challenges I see with this plan with the nurse or doctor.    ..........................................................................................................................................  Patient/Patient Representative Signature      ..........................................................................................................................................  Patient Representative Print Name and Relationship to Patient    ..................................................               ................................................  Date                                            Time    ..........................................................................................................................................  Reviewed by Signature/Title    ...................................................              ..............................................  Date                                                            Time

## 2018-06-01 NOTE — OP NOTE
Patient: Jesus Presley Age: 57 year old   MRN: [id number] Attending: Dr. Townsend     Date of Visit: June 1, 2018      PAIN MEDICINE CLINIC PROCEDURE NOTE    PREPROCEDURE DIAGNOSES:  1.  Left BKA stump neuroma  2.  Left BKA  stump pain      PROCEDURE(S) PERFORMED:  1. Pulsed radiofrequency ablation of left below knee amputation stump neuroma   2.  Ultrasound guidance for the above-named procedure(s)      ANESTHESIA:  Local.    BLOOD LOSS:  Minimal.    DRAINS AND SPECIMENS:  None.    COMPLICATIONS:  None.    INDICATIONS:  Jesus Presley is a 57 year old male with a history of  Chronic stump pain. His amputation wound was healed. After the amputation stump matured and scar was formed, the prosthesis of lower extremities was replaced with a new custom prosthesis. However, the stump pain has persisted since then and did not respond to various analgesics. When the prosthesis was fitted, persistent and unbearably excruciating pain at the stump site precluded the patient from doing rehabilitation program, including gait training.We have performed BKA stump neuroma injection with local anesthetic and steroid, which afforded excellent pain relief. Therefore he is scheduled for pulsed RFA in anticipation for better pain relief.       Procedure Details:    The patient was met in the procedure room, where the patient was identified by name, medical record number and date of birth.  All of the patient s last minute questions were answered. Written informed consent was obtained and saved in the electronic medical record, after the risks, benefits, and alternatives were discussed with the patient.      A formal time-out procedure was performed, as per protocol, including patient name, title of procedure, and site of procedure, and all in the room concurred.  Routine monitors were applied.      The patient was placed in the supine position on the procedure room table.Routine monitors were placed.  Vital signs were stable.    A  chlorhexidine prep was completed followed by sterile draping per standard procedure. The left BKA stump  area was prepped using Chloraprep and the area was sterilely draped.  The ultrasound probe was draped and the U/S was used to identify and confirm the depth.  Ultrasonography revealed discrete hypoechoic mass  in the distal stump, directly continuous to the common peroneal and tibial nerve, which was consistent with  neuroma. When the neuroma was pressed using a linear transducer (10 MHz), extreme pain was produced.   Under ultrasound guidance, we directed the radiofrequency 50 millimeter, 22-gauge RFA needle through the skin and subcutaneous tissues  Into neuroma.  At that point, the stylet was removed from the RF needle and an RFA probe was then inserted. Pulsed RFA was then carried out at 43 degrees Celsius for 120 seconds. Then RFA needle is rotated to 180 degree a ablation was then commenced at the above settings for a second lesion. Once the RF lesion was completed, 1 cubic centimeter of a solution consisting of 1 cubic centimeter of 40 mg per cubic centimeters triamcinolone and 7 cubic centimeters of 0.25% bupivacaine was injected through each RFA needle for a total of 1 cubic centimeters at each level.        Light pressure was held at the puncture site(s) to prevent ecchymosis and oozing.  The patient's skin was cleansed, and hemostasis was confirmed.  Band-aids were applied to the needle injection site(s).      Condition:    The patient remained awake and alert throughout the procedure.  The patient tolerated the procedure well and was monitored for approximately 15 minutes afterward in the post procedure area.  There were no immediate post procedure complications noted.  The patient was then discharged to home as per protocol.              Sedation:      Performed by: Martine Townsend    Indication:  Sedation is required to allow above mentioned procedure     Consent:  Consent obtained from the patient  Jesus Presley after discussing the risks, benefits and alternatives.    PO Intake:  Appropriately NPO for procedure    Lungs: Lungs Clear with good breath sounds bilaterally.     Heart: Normal heart sounds and rate    Focused history and physical completed prior to procedure. I have reviewed the lab findings, diagnostic data, medications, and the plan for sedation. I have determined this patient to be an appropriate candidate for the planned sedation and procedure and have reassessed the patient IMMEDIATELY PRIOR to sedation and procedure.      Sedation Post Procedure Summary:    Prior to the start of the procedure and with procedural staff participation, I verbally confirmed the patient s identity using two indicators, relevant allergies, that the procedure was appropriate and matched the consent or emergent situation, and that the correct equipment/implants were available. Immediately prior to starting the procedure I conducted the Time Out with the procedural staff and re-confirmed the patient s name, procedure, and site/side. (The Joint Commission universal protocol was followed.)  Yes      Sedatives: Fentanyl and Midazolam (Versed)    Vital signs, airway, End Tidal CO2 and pulse oximetry were monitored and remained stable throughout the procedure and sedation was maintained until the procedure was complete.  The patient was monitored by staff until sedation discharge criteria were met.    Patient tolerance: Patient tolerated the procedure well with no immediate complications.    Time of sedation in minutes:  35 minutes from beginning to end of physician one to one monitoring.

## 2018-06-01 NOTE — IP AVS SNAPSHOT
MRN:[id number]                      After Visit Summary   6/1/2018    Jesus Presley    MRN: [id number]           Thank you!     Thank you for choosing [facility] for your care. Our goal is always to provide you with excellent care. Hearing back from our patients is one way we can continue to improve our services. Please take a few minutes to complete the written survey that you may receive in the mail after you visit with us. Thank you!        Patient Information     Date Of Birth          1960        About your hospital stay     You were admitted on:  June 1, 2018 You last received care in the:  [facility]    You were discharged on:  June 1, 2018       Who to Call     For medical emergencies, please call 911.  For non-urgent questions about your medical care, please call your primary care provider or clinic, None  For questions related to your surgery, please call your surgery clinic        Attending Provider     Provider Martine Lopes MD Anesthesiology       Primary Care Provider    None Specified      Your next 10 appointments already scheduled     Jun 29, 2018 10:40 AM CDT   (Arrive by 10:25 AM)   Return Visit with ZELALEM Rodriguez [facility] ([facility])    [address] [place] [id number] [phone]              Further instructions from your care team       Home Care Instructions after a Radio Frequency Ablation    A radiofrequency ablation refers to a procedure that destroys the functionality of the nerve using radiofrequency energy. The physician uses x-ray guidance (fluoroscopy) to direct a special (radiofrequency) needle alongside the sacroiloac join. A small amount of electrical current is often carefully passed through the needle to assure it is next to the target nerve and a safe distance from other nerves. The targeted nerves will  then be numbed to minimize pain while the lesion is being created. The radiofrequency waves are introduced to heat the tip of the needle and a heat lesion is created on the nerve to disrupt the nerve's ability to send pain signals. Please follow the aftercare instructions regarding your procedure.    Activity  -Rest today  -Do not work today  -Resume normal activity in 2-3 days  -No heavy lifting, turning or twisting for 24 hours  -DO NOT shower or soak in tub for 24 hours  -DO NOT remove bandaid for 24 hours    Pain  -You may experience soreness at the injection site for one or two days  -You may use an ice pack for 20 minutes every 2 hours for the first 24 hours, longer if needed for comfort, do not use heat  -You may use Tylenol (acetaminophen) every 4 hours or other pain medicines as     directed by your physician  -If other pain medications are prescribed by your physician, please follow dosing instructions carefully.    What to expect  You may experience numbness radiating into your legs or arms (depending on the procedure location). This numbness may last several hours. Until sensation returns to normal, please use caution in walking, climbing stairs, and stepping out of your vehicle, etc. Relief of your initial symptoms can take up to 4 weeks to feel better, this is normal and due to the healing process. The procedure site may feel like a deep burn. Using ice will greatly minimize this discomfort. Do not use numbing creams or patches over your injection sites immediately following your procedure. Please keep injection sites covered, clean and dry for 24 hours.    DID YOU RECEIVE SEDATION TODAY?  Yes    Safety  Sedation medicine, if given, may remain active for many hours. It is important for the next 24 hours that you do not:  -Drive a car  -Operate machines or power tools  -Consume alcohol, including beer  -Sign any important papers or legal documents  -Please have a responsible adult with you for 24 hours  "following any sedation    DID YOU RECEIVE STEROIDS TODAY?  Yes    Common side effects of steroids:  Not everyone will experience corticosteroid side effects. If side effects are experienced, they will gradually subside in the 7-10 day period following an injection. Most common side effects include:  -Flushed face and/or chest  -Feeling of warmth, particularly in the face but could be an overall feeling of warmth  -Increased blood sugar in diabetic patients  -Menstrual irregularities my occur. If taking hormone-based birth control an alternate method of birth control is recommended  -Sleep disturbances and/or mood swings are possible  -Leg cramps      Please contact us if you have:  -Severe pain  -Fever more than 101.5 degrees Fahrenheit  -Signs of infection at the injection site (redness, swelling, or drainage)    If you have questions, please contact our office at [phone] between the hours of 7:00 am and 3:00 pm Monday through Friday. After office hours you can contact the on call provider by dialing [phone]. If you need immediate attention, we recommend that you go to a hospital emergency room or dial [phone].    Pending Results     No orders found from 5/30/2018 to 6/2/2018.            Admission Information     Date & Time Provider Department Dept. Phone    6/1/2018 Martine Townsend MD [facility] [phone]      Your Vitals Were     Blood Pressure Pulse Temperature Respirations Height Weight    126/81 76 97.9  F (36.6  C) (Temporal) 12 1.854 m (6' 1\") 97.5 kg (215 lb)    Pulse Oximetry BMI (Body Mass Index)                94% 28.37 kg/m2          Recorded Future Information     Recorded Future is an electronic gateway that provides easy, online access to your medical records. With Recorded Future, you can request a clinic appointment, read your test results, renew a prescription or communicate with your care team.     To sign up for Recorded Future visit the website at [email or web address]   You will be " asked to enter the access code listed below, as well as some personal information. Please follow the directions to create your username and password.     Your access code is: [email or web address]  Expires: 2018  9:33 AM     Your access code will  in 90 days. If you need help or a new code, please contact your [facility] or call [phone] for assistance.        Care EveryWhere ID     This is your Care EveryWhere ID. This could be used by other organizations to access your [facility] medical records  [id number]        Equal Access to Services     [facility] : Hadkd Mary, wawolf dickey, qaeri kaalmakimmie kathleen, vincenzo yao . So [facility] [phone].    ATENCIÓN: Si habla español, tiene a sweeney disposición servicios gratuitos de asistencia lingüística. Llame al [phone].    We comply with applicable federal civil rights laws and [place] laws. We do not discriminate on the basis of race, color, national origin, age, disability, sex, sexual orientation, or gender identity.               Review of your medicines      UNREVIEWED medicines. Ask your doctor about these medicines        Dose / Directions    ADVIL PO        Dose:  600 mg   Take 600 mg by mouth every 8 hours as needed for moderate pain   Refills:  0       CYMBALTA PO   Indication:  Patient doesn't know dosage.        Refills:  0       EXCEDRIN MIGRAINE 250-250-65 MG per tablet   Generic drug:  aspirin-acetaminophen-caffeine        Dose:  1 tablet   Take 1 tablet by mouth every 6 hours as needed for headaches   Refills:  0       pregabalin 25 MG capsule   Commonly known as:  LYRICA   Used for:  Stump pain (H)        Dose:  25 mg   Take 1 capsule (25 mg) by mouth 2 times daily   Quantity:  60 capsule   Refills:  1         CONTINUE these medicines which have NOT CHANGED        Dose / Directions    order for DME   Used for:  Painful amputation stump (H), Stump pain (H)         Equipment being ordered: TENS   Quantity:  1 Device   Refills:  0                Protect others around you: Learn how to safely use, store and throw away your medicines at www.disposemymeds.org.             Medication List: This is a list of all your medications and when to take them. Check marks below indicate your daily home schedule. Keep this list as a reference.      Medications           Morning Afternoon Evening Bedtime As Needed    ADVIL PO   Take 600 mg by mouth every 8 hours as needed for moderate pain                                CYMBALTA PO                                EXCEDRIN MIGRAINE 250-250-65 MG per tablet   Take 1 tablet by mouth every 6 hours as needed for headaches   Generic drug:  aspirin-acetaminophen-caffeine                                order for DME   Equipment being ordered: TENS                                pregabalin 25 MG capsule   Commonly known as:  LYRICA   Take 1 capsule (25 mg) by mouth 2 times daily

## 2019-01-18 ASSESSMENT — MIFFLIN-ST. JEOR
SCORE: 1885.88
SCORE: 1838.65

## 2019-01-19 ENCOUNTER — SURGERY - HEALTHEAST (OUTPATIENT)
Dept: SURGERY | Facility: CLINIC | Age: 59
End: 2019-01-19

## 2019-01-19 ENCOUNTER — ANESTHESIA - HEALTHEAST (OUTPATIENT)
Dept: SURGERY | Facility: CLINIC | Age: 59
End: 2019-01-19

## 2019-01-21 ENCOUNTER — TELEPHONE (OUTPATIENT)
Dept: FAMILY MEDICINE | Facility: CLINIC | Age: 59
End: 2019-01-21

## 2019-01-21 NOTE — TELEPHONE ENCOUNTER
Date of discharge: 01/20/19  Facility of discharge: [facility]  Patient concerns about condition: No concerns at this time.  Patient concerns about medications: No concerns at this time.  Full med reconciliation will be completed at clinic visit.  Patient concerns about transitioning: No concerns at this time.  Clinic office visit appointment date: N/A pt states he will keep [facility] in mind but wants to wait a few more days to heal to schedule a f/u visit.   Patient reminded to bring all medications (prescription and over-the-counter) to clinic appointment: Yes    DESIRE Lee

## 2019-03-27 ENCOUNTER — OFFICE VISIT (OUTPATIENT)
Dept: FAMILY MEDICINE | Facility: CLINIC | Age: 59
End: 2019-03-27
Payer: COMMERCIAL

## 2019-03-27 VITALS
DIASTOLIC BLOOD PRESSURE: 101 MMHG | BODY MASS INDEX: 30.87 KG/M2 | TEMPERATURE: 98.2 F | OXYGEN SATURATION: 98 % | HEART RATE: 90 BPM | WEIGHT: 234 LBS | SYSTOLIC BLOOD PRESSURE: 159 MMHG | RESPIRATION RATE: 24 BRPM

## 2019-03-27 DIAGNOSIS — S88.912A: ICD-10-CM

## 2019-03-27 DIAGNOSIS — R03.0 ELEVATED BLOOD PRESSURE READING WITHOUT DIAGNOSIS OF HYPERTENSION: ICD-10-CM

## 2019-03-27 DIAGNOSIS — Z89.512 STATUS POST BELOW KNEE AMPUTATION OF LEFT LOWER EXTREMITY: Primary | ICD-10-CM

## 2019-03-27 PROBLEM — S88.919A: Status: ACTIVE | Noted: 2019-03-27

## 2019-03-27 RX ORDER — DULOXETIN HYDROCHLORIDE 60 MG/1
60 CAPSULE, DELAYED RELEASE ORAL DAILY
COMMUNITY
Start: 2017-11-24 | End: 2019-12-19
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
## 2019-03-28 NOTE — PATIENT INSTRUCTIONS
Thank you for coming to [facility].  **If you had lab testing today and your results are reassuring or normal they will be be mailed to you within 7 days.   **If the lab tests need quick action we will call you with the results.  The phone number we will call with results is # [phone] (home) . If this is not the best number please call our clinic and change the number.  If you need any refills please call your pharmacy and they will contact us.  If you have any concerns about today's visit or wish to schedule another appointment please call our office during normal business hours [phone] (8-5:00 M-F)  If you have urgent medical concerns please call [phone] at any time of the day.  If you a medical emergency please call [phone]  Again thank you for choosing [facility] and please let us know how we can best partner with you to improve you and your family's health.

## 2019-04-02 ENCOUNTER — OFFICE VISIT (OUTPATIENT)
Dept: FAMILY MEDICINE | Facility: CLINIC | Age: 59
End: 2019-04-02
Payer: COMMERCIAL

## 2019-04-02 VITALS
DIASTOLIC BLOOD PRESSURE: 100 MMHG | HEART RATE: 77 BPM | BODY MASS INDEX: 31.4 KG/M2 | TEMPERATURE: 97.7 F | OXYGEN SATURATION: 100 % | WEIGHT: 238 LBS | SYSTOLIC BLOOD PRESSURE: 166 MMHG | RESPIRATION RATE: 20 BRPM

## 2019-04-02 DIAGNOSIS — H54.3 DECREASED VISION IN BOTH EYES: ICD-10-CM

## 2019-04-02 DIAGNOSIS — L40.9 PSORIASIS: ICD-10-CM

## 2019-04-02 DIAGNOSIS — I10 ESSENTIAL HYPERTENSION: ICD-10-CM

## 2019-04-02 DIAGNOSIS — B07.8 PALMAR WART: ICD-10-CM

## 2019-04-02 DIAGNOSIS — F17.200 SMOKER: ICD-10-CM

## 2019-04-02 DIAGNOSIS — Z00.01 ENCOUNTER FOR ROUTINE ADULT MEDICAL EXAM WITH ABNORMAL FINDINGS: Primary | ICD-10-CM

## 2019-04-02 DIAGNOSIS — N40.1 BENIGN PROSTATIC HYPERPLASIA WITH LOWER URINARY TRACT SYMPTOMS, SYMPTOM DETAILS UNSPECIFIED: ICD-10-CM

## 2019-04-02 DIAGNOSIS — Z89.512 STATUS POST BELOW KNEE AMPUTATION OF LEFT LOWER EXTREMITY: ICD-10-CM

## 2019-04-02 RX ORDER — TRIAMCINOLONE ACETONIDE 1 MG/G
CREAM TOPICAL 2 TIMES DAILY
Qty: 30 G | Refills: 1 | Status: SHIPPED | OUTPATIENT
Start: 2019-04-02 | End: 2019-12-19

## 2019-04-02 NOTE — PATIENT INSTRUCTIONS
Patient Education     High Blood Pressure, New, Begin Treatment  Your blood pressure was high enough today to start treatment with medicines. Often healthcare providers don t know what causes high blood pressure (hypertension). But it can be controlled with lifestyle changes and medicines. High blood pressure usually has no symptoms. But it can sometimes cause headache, dizziness, blurred vision, a rushing sound in your ears, chest pain, or shortness of breath. But even without symptoms, high blood pressure that s not treated raises your risk for heart attack, heart failure, and stroke. High blood pressure is a serious health risk and shouldn t be ignored.    Blood pressure measurements are given as 2 numbers. Systolic blood pressure is the upper number. This is the pressure when the heart contracts. Diastolic blood pressure is the lower number. This is the pressure when the heart relaxes between beats. You will see your blood pressure readings written together. For example, a person with a systolic pressure of 118 and a diastolic pressure of 78 will have 118/78 written in the medical record.   Blood pressure is categorized as normal, elevated, or stage 1 or stage 2 high blood pressure:    Normal blood pressure is systolic of less than 120 and diastolic of less than 80 (120/80)    Elevated blood pressure is systolic of 120 to 129 and diastolic less than 80    Stage 1 high blood pressure is systolic is 130 to 139 or diastolic between 80 to 89    Stage 2 high blood pressure is when systolic is 140 or higher or the diastolic is 90 or higher  Home care  If you have high blood pressure, you should do what is listed below to lower your blood pressure. If you are taking medicines for high blood pressure, these methods may reduce or end your need for medicines in the future.    Begin a weight-loss program if you are overweight.    Cut back on how much salt you get in your diet. Here s how to do this:  ? Don t eat foods  that have a lot of salt. These include olives, pickles, smoked meats, and salted potato chips.  ? Don t add salt to your food at the table.  ? Use only small amounts of salt when cooking.  ? Review food labels to track how much salt is in prepared foods.  ? When eating out, ask that no additional salt be added to your food order.    Begin an exercise program. Talk with your healthcare provider about the type of exercise program that would be best for you. It doesn't have to be hard. Even brisk walking for 20 minutes 3 times a week is a good form of exercise.    Don t take medicines that have heart stimulants. This includes many over-the-counter cold and sinus decongestant pills and sprays, as well as diet pills. Check the warnings about high blood pressure on the label. Before purchasing any over-the-counter medicines or supplements, always ask the pharmacist about the product's potential interaction with your high blood pressure and your high blood pressure medicines.    Stimulants such as amphetamine or cocaine could be lethal for someone with high blood pressure. Never take these.    Limit how much caffeine you get in your diet. Switch to caffeine-free products.    Stop smoking. If you are a long-time smoker, this can be hard. Enroll in a stop-smoking program to make it more likely that you will quit for good.    Learn how to handle stress. This is an important part of any program to lower blood pressure. Learn about relaxation methods like meditation, yoga, or biofeedback.    If your provider prescribed medicines, take them exactly as directed. Missing doses may cause your blood pressure get out of control.    If you miss a dose or doses, check with your healthcare provider or pharmacist about what to do.    Consider buying an automatic blood pressure machine. Your provider can make a recommendation. You can get one of these at most pharmacies.  The American Heart Association recommends the following guidelines  for home blood pressure monitoring:    Don't smoke or drink coffee or other caffeinated drinks for 30 minutes before taking your blood pressure.    Go to the bathroom before the test.    Relax for 5 minutes before taking the measurement.    Sit with your back supported (don't sit on a couch or soft chair); keep your feet on the floor uncrossed. Place your arm on a solid flat surface (like a table) with the upper part of the arm at heart level. Place the middle of the cuff directly above the bend of the elbow. Check the monitor's instruction manual for an illustration.    Take multiple readings. When you measure, take 2 to 3 readings one minute apart and record all of the results.    Take your blood pressure at the same time every day, or as your healthcare provider recommends.    Record the date, time, and blood pressure reading.    Take the record with you to your next medical appointment. If your blood pressure monitor has a built-in memory, simply take the monitor with you to your next appointment.    Call your provider if you have several high readings. Don't be frightened by a single high blood pressure reading, but if you get several high readings, check in with your healthcare provider.    Note: When blood pressure reaches a systolic (top number) of 180 or higher OR diastolic (bottom number) of 110 or higher, seek emergency medical treatment.  Follow-up care  Because a new blood pressure medicine was started today, it s important that you have your blood pressure rechecked. This is to make sure that the medicine is working and that you have no serious side effects. Keep all your follow up appointments. Write down medicine and blood pressure questions and bring them to your next appointment. If you have pressing concerns about your new medicine or your blood pressure, call your provider. Unless told otherwise, follow up with your healthcare provider or this facility within the next 3 days.  When to seek  medical advice  Call your healthcare provider right away if any of these occur:    Blood pressure reaches a systolic (top number) of 180 or higher, OR diastolic (bottom number) of 110 or higher, seek emergency medical treatment.    Chest pain or shortness of breath    Severe headache    Throbbing or rushing sound in the ears    Nosebleed    Sudden severe pain in your belly (abdomen)    Extreme drowsiness, confusion, or fainting    Dizziness or dizziness with a spinning sensation (vertigo)    Weakness of an arm or leg or one side of the face    You have problems speaking or seeing   Date Last Reviewed: 12/1/2016 2000-2018 [employer]. [address] [place] [id number]. All rights reserved. This information is not intended as a substitute for professional medical care. Always follow your healthcare professional's instructions.           Patient Education     Benign Prostatic Hyperplasia    The prostate is a small gland that in men that makes a fluid that goes into semen. As you age, the prostate grows. If it becomes too big, it may cause problems with urination. This condition is called benign prostatic hyperplasia (BPH). BPH is not a cancer.  Symptoms of BPH  BPH is common in men over age 60. That s because the prostate grows bigger during a man s life. As it grows, it presses against the urethra. The urethra is the tube that carries urine out of your body from your bladder through your penis. Your bladder may also weaken as you age. It may not empty completely after you urinate.  Men with BPH may have these symptoms:    The urge to frequently urinate, especially at night    Leaking or dribbling of urine    A weak stream of urine    Not able to urinate, or having trouble starting to urinate  Diagnosing BPH  BPH can hurt your bladder and kidneys. It can also lead to bladder stones and urinary tract infections. If you think you may have BPH, talk with your healthcare provider. Early treatment  can prevent problems.  Several tests can diagnose BPH. These include:    Digital rectal exam. During this procedure, your provider puts a gloved, greased (lubricated) finger into your rectum to check the size of your prostate.    Imaging tests. X-rays and other imaging tests can find problems in your kidneys or bladder.    Cystoscopy. This test uses a flexible tube with a camera (called a scope). The scope is passed up the urethra to look inside your urinary tract.    Urine flow study. This test uses a special device to see how fast urine leaves your body.    Prostate ultrasound. This test uses sound waves to view the size and inside of the prostate gland.  Treating BPH  If you have mild symptoms, you may not need treatment. You may be able to control your BPH with lifestyle changes. Some men feel better if they limit or avoid alcohol and caffeinated drinks like coffee. Not drinking too many fluids at night can also help. Increasing your physical activity may ease symptoms, too.  Kegel exercises may also help. They strengthen the pelvic muscle to prevent urine from leaking. While urinating, contract your pelvic muscle to stop or slow down the flow of urine. Hold for 10 seconds. Repeat at least 5 times. Do the exercise 3 to 5 times each day.  Certain medicines can worsen BPH symptoms. These include medicines for congestion, allergies, and depression. Medicines that increase your urine flow (diuretics or water pills) can also worsen BPH symptoms. If you take any of these, talk with your provider. You may need to take another medicine or change how much you take.  BPH symptoms often get worse as the prostate grows. So at some point you may need treatment. Your provider may prescribe medicine to shrink the prostate or stop its growth. Other treatments can make the urethra wider to let urine flow more easily. There are also some minimally invasive techniques to remove prostate tissue.  If your BPH is severe, your  healthcare provider may recommend surgery. Surgery takes out enlarged parts of the prostate gland. Your healthcare provider can figure out the best option for you based on your age, overall health, and other factors.  BPH and prostate cancer  BPH and prostate cancer share some symptoms. That s why it s important to talk with your provider about your symptoms. Men with BPH aren t more likely to develop this cancer. But they may have higher levels of the prostate-specific antigen (PSA). A higher PSA level may also be a sign of prostate cancer. Certain tests help tell BPH from prostate cancer. They include prostate ultrasound and biopsy.  Date Last Reviewed: 6/1/2017 2000-2018 [employer]. [address] [place] [address], [place] [address]. All rights reserved. This information is not intended as a substitute for professional medical care. Always follow your healthcare professional's instructions.           Preventive Health Recommendations  Male Ages 50 - 64    Yearly exam:             See your health care provider every year in order to  o   Review health changes.   o   Discuss preventive care.    o   Review your medicines if your doctor has prescribed any.     Have a cholesterol test every 5 years, or more frequently if you are at risk for high cholesterol/heart disease.     Have a diabetes test (fasting glucose) every three years. If you are at risk for diabetes, you should have this test more often.     Have a colonoscopy at age 50, or have a yearly FIT test (stool test). These exams will check for colon cancer.      Talk with your health care provider about whether or not a prostate cancer screening test (PSA) is right for you.    You should be tested each year for STDs (sexually transmitted diseases), if you re at risk.     Shots: Get a flu shot each year. Get a tetanus shot every 10 years.     Nutrition:    Eat at least 5 servings of fruits and vegetables daily.     Eat whole-grain bread, whole-wheat  pasta and brown rice instead of white grains and rice.     Get adequate Calcium and Vitamin D.     Lifestyle    Exercise for at least 150 minutes a week (30 minutes a day, 5 days a week). This will help you control your weight and prevent disease.     Limit alcohol to one drink per day.     No smoking.     Wear sunscreen to prevent skin cancer.     See your dentist every six months for an exam and cleaning.     See your eye doctor every 1 to 2 years.            April 2, 2019  Per patient would like any day for appointment in the afternoon.  Francia WEBBER    [facility]   [address] [place] [address]  Phone: [phone]    Appointment: Thursday May 1, 2019  Arrival Time: 1:30pm  Provider: Dr Lisa Cat      Preventive Health Recommendations  Male Ages 50 - 64    Yearly exam:             See your health care provider every year in order to  o   Review health changes.   o   Discuss preventive care.    o   Review your medicines if your doctor has prescribed any.     Have a cholesterol test every 5 years, or more frequently if you are at risk for high cholesterol/heart disease.     Have a diabetes test (fasting glucose) every three years. If you are at risk for diabetes, you should have this test more often.     Have a colonoscopy at age 50, or have a yearly FIT test (stool test). These exams will check for colon cancer.      Talk with your health care provider about whether or not a prostate cancer screening test (PSA) is right for you.    You should be tested each year for STDs (sexually transmitted diseases), if you re at risk.     Shots: Get a flu shot each year. Get a tetanus shot every 10 years.     Nutrition:    Eat at least 5 servings of fruits and vegetables daily.     Eat whole-grain bread, whole-wheat pasta and brown rice instead of white grains and rice.     Get adequate Calcium and Vitamin D.     Lifestyle    Exercise for at least 150 minutes a week (30 minutes a day, 5 days a week). This will  help you control your weight and prevent disease.     Limit alcohol to one drink per day.     No smoking.     Wear sunscreen to prevent skin cancer.     See your dentist every six months for an exam and cleaning.     See your eye doctor every 1 to 2 years.

## 2019-04-02 NOTE — PROGRESS NOTES
"  Male Physical Note      Concerns today: The patient comes in today for a complete physical examination.  Additionally, he has several concerns.    The patient has urinary frequency and urgency.  This is been present for quite some time, and slowly progressive.  He notes that his urinary stream is much weaker than it has been previously.  He does not have any dysuria.  He has nocturia which can range from 0 up to every hour.  The patient tells me that he \"rarely\" sleeps through the night.    The patient has a wart on his left palm.  He treats this by paring it down consistently.  It is not gone away, and he wonders what can be done about it.    The patient has a dysmorphic right thumbnail.  This is been present for quite some time.  Apparently a previous physician tried to treat this with freezing the nail matrix with liquid nitrogen.  This was ineffective.  The patient wonders what we can do about it.    The patient notes that he has generally poor vision.  He would like to see an eye doctor.    The patient notes that he does not have any bad teeth.  This makes it quite difficult for him to chew food.  He would like to see a dentist, and is contemplating having all of his teeth pulled and just using dentures.    The patient wonders whether or not he qualifies for having personal care attendant.  He does not have any specific concerns or needs for this, but believes that this would be helpful for him.    The patient and I discussed that his blood pressure is elevated.  The patient tells me that he has been under quite a bit of stress and pain since his traumatic amputation of his left lower leg.  The patient does tell me that he went to [facility] and obtained a blood pressure cuff from them.  He notes that in general his blood pressures are in the 160s-170s over in the 90s.  He is asymptomatic.    The patient's significant other is present throughout the visit.    ROS:                      CONSTITUTIONAL: no " fatigue, no unexpected change in weight  SKIN: no worrisome rashes, no worrisome moles, no worrisome lesions, + wart as noted above  EYES:  vision problems as noted above.  ENT: no ear problems, no mouth problems (other than his dental problems), no throat problems  RESP: no significant cough, no shortness of breath  CV: no chest pain, no palpitations, no new or worsening peripheral edema  GI: no nausea, no vomiting, no constipation, no diarrhea  : no frequency, no dysuria, no hematuria  MS: no claudication, no myalgias, no joint aches  NEURO: no weakness, no dizziness, no syncope, no headaches  ENDOCRINE: no temperature intolerance, no skin/hair changes    History reviewed. No pertinent past medical history.     Family History   Family history unknown: Yes     Reviewed no other significant FH           Family History and past Medical History reviewed and unchanged/updated.    Social History     Tobacco Use     Smoking status: Current Every Day Smoker     Packs/day: 1.00     Types: Cigarettes     Smokeless tobacco: Never Used   Substance Use Topics     Alcohol use: Yes     Partnered    Has anyone hurt you physically, for example by pushing, hitting, slapping or kicking you or forcing you to have sex? Denies  Do you feel threatened or controlled by a partner, ex-partner or anyone in your life? Denies    RISK BEHAVIORS AND HEALTHY HABITS:  Tobacco Use/Smoking: Details See above  Sexually Active: Yes  Diet (5-7 servings of fruits/veg daily): No   Exercise (30 min accumulated most days):No  Dental Care: No   Calcium 1500 mg/d:  No  Seat Belt Use: Yes   Immunization History   Administered Date(s) Administered     HepB-Adult 01/30/2004, 08/05/2004, 02/14/2006     Reviewed Immunization Record Today    EXAMINATION:  BP (!) 166/100   Pulse 77   Temp 97.7  F (36.5  C) (Oral)   Resp 20   Wt 108 kg (238 lb)   SpO2 100%   BMI 31.40 kg/m    GENERAL: healthy, alert and no distress  EYES: Eyes grossly normal to inspection,  extraocular movements - intact, and PERRL  HENT: ear canals- normal; TMs- normal; Nose- normal; Mouth- no ulcers, no lesions  NECK: no tenderness, + R submandibular adenopathy, no asymmetry, no masses, no stiffness; thyroid- normal to palpation  RESP: lungs clear to auscultation - no rales, no rhonchi, no wheezes  CV: regular rates and rhythm, normal S1 S2, no S3 or S4 and no murmur, no click or rub -  ABDOMEN: soft, no tenderness, no  hepatosplenomegaly, no masses, normal bowel sounds  MS: extremities- no gross deformities noted, no edema  SKIN: Patient has a dystrophic nail on his right thumb.  He has a rash at the base of the right thumb nail which is consistent with eczema or psoriasis.  He has a plaque on over his on the skin over his sternum which is currently consistent with psoriasis.  He has a wart on the palmar aspect of his left hand.  NEURO: strength and tone- normal, sensory exam- grossly normal, mentation- intact, speech- normal, reflexes- symmetric  BACK: no CVA tenderness, no paralumbar tenderness  PSYCH: Alert and oriented times 3; speech- coherent , normal rate and volume; able to articulate logical thoughts, able to abstract reason, no tangential thoughts, no hallucinations or delusions, affect- normal  LYMPHATICS: ant. cervical- normal, post. 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  Nonetheless, the patient is interested in exploring this option, so a home health care referral is placed.    Benign prostatic  "hyperplasia with lower urinary tract symptoms, symptom details unspecified    The patient and I had a lengthy discussion regarding his options.  We discussed BPH as a risk factor for renal disease.  The patient \"hates\" taking medication.  He wonders if there is a \"anatomic pill\" that he could take for short period of time that would clear him of his BPH.  I discussed with the patient that he that there is not such a pill.  I offered him options of a visit to urology, a discussion with the urologist regarding surgical treatment of his BPH, or daily alpha blocker.  The patient is not happy with any of those options, and he would prefer to continue on with his current symptoms.    Palmar wart    Discussed the etiology of warts with the patient.  I discussed treatment options including cryotherapy, referral to dermatology, or  at home with an acidic solution.  The patient elects to have cryotherapy in our office.  The wart was frozen allowed to thaw and then refrozen.  The patient tolerated this well.  The natural history of lesions treated with cryotherapy was discussed with the patient.    Smoker    The patient is not interested in discussion regarding smoking cessation at this point.  We will readdress this in the future.    Essential hypertension    I discussed hypertension with the patient.  We discussed it's asymptomatic nature.  I discussed the risk of untreated hypertension increasing his risk for CVA, MI, and renal disease.  We discussed therapy with a potential range of medication options.  We discussed that I likely would be able to find a medication that he can tolerate without significant side effects.  The patient is unwilling to start on medication at this point.  He clearly understands the risks of untreated hypertension.    The patient is provided with a reference list of dental providers that are in our area and would take his [place] care insurance.      "

## 2019-04-05 DIAGNOSIS — S88.912D TRAUMATIC AMPUTATION OF LEFT LOWER EXTREMITY, SUBSEQUENT ENCOUNTER (H): Primary | ICD-10-CM

## 2019-04-05 NOTE — PROGRESS NOTES
Phone call from [facility].  They need an order to evaluate and provide a prosthesis for patient.  Please see previous notes.  Order written and provided to staff to fax ([phone])

## 2019-04-26 ENCOUNTER — MEDICAL CORRESPONDENCE (OUTPATIENT)
Dept: HEALTH INFORMATION MANAGEMENT | Facility: CLINIC | Age: 59
End: 2019-04-26

## 2019-04-26 ENCOUNTER — OFFICE VISIT (OUTPATIENT)
Dept: FAMILY MEDICINE | Facility: CLINIC | Age: 59
End: 2019-04-26
Payer: COMMERCIAL

## 2019-04-26 VITALS
SYSTOLIC BLOOD PRESSURE: 148 MMHG | RESPIRATION RATE: 20 BRPM | TEMPERATURE: 97.5 F | DIASTOLIC BLOOD PRESSURE: 95 MMHG | BODY MASS INDEX: 31.11 KG/M2 | OXYGEN SATURATION: 99 % | HEART RATE: 89 BPM | WEIGHT: 235.8 LBS

## 2019-04-26 DIAGNOSIS — S88.912D TRAUMATIC AMPUTATION OF LEFT LOWER EXTREMITY, SUBSEQUENT ENCOUNTER (H): Primary | ICD-10-CM

## 2019-04-26 NOTE — PROGRESS NOTES
"There are no exam notes on file for this visit.  Chief Complaint   Patient presents with     Forms     Musculoskeletal Problem     right shoulder is sore,        Subjective: The patient comes in today to follow-up his previous visit to discuss a new prosthetic for his left below the knee amputation.    Additionally, the patient has concerns about right shoulder discomfort, and a wart on his left palm.  We did not have time to discuss these today, so the patient will make a visit in the future for those.  He notes that the wart did not change significantly since the last episode of cryotherapy.  He notes that the right shoulder does not have pain at rest, but does give him pain at night.    The patient continues to complain of \"problems with my incision\" he has ongoing pain.  He is quite convinced that there is something wrong in the area of his previous surgery.  He would very much like to see a surgeon.  He has lost [facility] at [facility], and would like to see another group.  I discussed the concept of phantom pain, which the patient is well aware of.  He has previously had injections to nerves at the end of his leg which have been partially helpful.    The patient is quite motivated to obtain a new prosthesis.  He has many functional problems with the current prosthesis that we expect will be eliminated with the new prosthesis.    The patient notes that with his current prosthesis he is unable to walk on uneven terrain.  He notes that if he is on unable on even terrain, that he will stumble with his footing due to this prosthesis.  He has fallen in the past due to this.  Additionally, he is only able to walk about 8 blocks.  Prior to his amputation he used to walk for up to 10 miles at a time and run on occasion.  He expects to get back to being able to walk 10 miles with his dog, and to be able to walk in the park which has uneven terrain.    The patient previously was able to climb ladders and " with the unstable prosthetic that he currently has he is unable to do that.  He expects to be able to do that, so that he can continue with home maintenance.    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  He is unable to do that with the current prosthetic, and expects that he will be able to go back to work with the new prosthetic leg.    The patient is an avid motorcycle rider.  He is unable to adequately heel shift with his current prosthesis.  He notes that he has been shifting with his hand, but this requires him to take one hand off of the handlebars, and he considers this to be dangerous, and does not he does not have a level of control he had before.  He expects that he would be able to go back to heal shifting with a new prosthesis.    The patient describes himself as a very active person.  He has such problems with his current prosthesis that he needs to use crutches inside of the house.  He is quite limited in his activities, and this is affecting his mindset.    Additionally, the patient was avidly involved in outdoor activities prior to the amputation.  These included hunting and fishing on occasion, hiking, softball, and camping.  He also enjoys woodworking.  All of these are affected by the current prosthetic because he is unable to stand for prolonged periods of time, and unable to walk on uneven terrain as noted above.  He anticipates that all of these would be improved with a better functioning prosthesis.    The patient notes that he is unable to climb stairs normally.  He needs to place his good foot on the step and then bring the prosthetic leg up to that step and then advance to the next step with a good foot.  He believes that with a new prosthetic he will be able to walk up the steps are properly.    The patient is currently having difficulty doing outside maintenance of his  house including painting sanding, and we placing overhead lights.  He also is unable to mow and rake his lawn adequately.  He anticipates that all of these would be improved with a new prosthesis.    Objective:    Blood pressure (!) 148/95, pulse 89, temperature 97.5  F (36.4  C), temperature source Oral, resp. rate 20, weight 107 kg (235 lb 12.8 oz), SpO2 99 %.  Body mass index is 31.11 kg/m .    General:  Well nourished, and in no acute distress.  The vital signs are reviewed    Assessment and plan:      Traumatic amputation of left lower extremity, subsequent encounter (H)  -     GENERAL SURG ADULT REFERRAL; Future     A total of 29 minutes was spent in face to face contact with patient with > 50% in counseling and coordination of care.  We discussed the above items      Patient Instructions   Please call our office if you do not hear from us about the surgery referral on Monday    I will write a report and [facility] about our discussion today    Thank you for coming to [facility].    The phone number we will call with results is # [phone] (home) . If this is not the best number please call our clinic and change the number.  If you need any refills please call your pharmacy and they will contact us.  If you have any concerns about today's visit or wish to schedule another appointment please call our office during normal business hours [phone] (8-5:00 M-F)  If you have urgent medical concerns please call [phone] at any time of the day.  If you a medical emergency please call [phone]  Again thank you for choosing [facility] and please let us know how we can best partner with you to improve you and your family's health.        The patient was actively involved in the decision making process, and all the questions were answered to their satisfaction prior to leaving.

## 2019-04-26 NOTE — PATIENT INSTRUCTIONS
Please call our office if you do not hear from us about the surgery referral on Monday    I will write a report and send it to [facility] about our discussion today    Thank you for coming to [facility].    The phone number we will call with results is # [phone] (home) . If this is not the best number please call our clinic and change the number.  If you need any refills please call your pharmacy and they will contact us.  If you have any concerns about today's visit or wish to schedule another appointment please call our office during normal business hours [phone] (8-5:00 M-F)  If you have urgent medical concerns please call [phone] at any time of the day.  If you a medical emergency please call [phone]  Again thank you for choosing [facility] and please let us know how we can best partner with you to improve you and your family's health.    General Surgery  [facility] General Surgery-Triage nurse needs to speak to patient, I left her a voicemail and she will call patient to schedule.  Magi Whittington, CMA  5/8/19

## 2019-10-22 ENCOUNTER — OFFICE VISIT (OUTPATIENT)
Dept: FAMILY MEDICINE | Facility: CLINIC | Age: 59
End: 2019-10-22
Payer: COMMERCIAL

## 2019-10-22 VITALS
BODY MASS INDEX: 31.16 KG/M2 | HEART RATE: 87 BPM | DIASTOLIC BLOOD PRESSURE: 97 MMHG | TEMPERATURE: 98 F | WEIGHT: 236.2 LBS | OXYGEN SATURATION: 95 % | SYSTOLIC BLOOD PRESSURE: 159 MMHG | RESPIRATION RATE: 19 BRPM

## 2019-10-22 DIAGNOSIS — S88.912D TRAUMATIC AMPUTATION OF LEFT LOWER EXTREMITY, SUBSEQUENT ENCOUNTER (H): Primary | ICD-10-CM

## 2019-10-22 NOTE — PATIENT INSTRUCTIONS
Thank you for coming to [facility].  Stop at the  to get the referral to Infectious Disease  The phone number we will call with results is # [phone] (home) . If this is not the best number please call our clinic and change the number.  If you need any refills please call your pharmacy and they will contact us.  If you have any concerns about today's visit or wish to schedule another appointment please call our office during normal business hours [phone] (8-5:00 M-F)  If you have urgent medical concerns please call [phone] at any time of the day.  If you a medical emergency please call [phone]  Again thank you for choosing [facility] and please let us know how we can best partner with you to improve you and your family's health.    Infectious Disease Referral (Non-Tb)    [facility] Location:  [address] [place] [address]   Phone: [phone]  Fax:[phone]    Faxed demographics, referral , office note, med list to [phone].    Karissa Oakes

## 2019-10-23 NOTE — PROGRESS NOTES
"There are no exam notes on file for this visit.  Chief Complaint   Patient presents with     Pain     have pain on the left leg for quite a while per patient.     Medication Reconciliation     reviewed.        Subjective: The patient comes in today accompanied by his daughter, who is present throughout the visit.    The patient comes in complaining of left leg pain.  He states the pain is worse than it was a year ago.  He notes that he sometimes developed swelling in that leg and sores.  He notes that it is painful to put the prosthetic leg onto the stump.    The patient notes that he previously has a history of bone infection, and he is quite sure that that is what is going on today.  He notes that he had blood tests that were performed when he had the previous bone infection and they were all within normal limits.  He tells me that then they opened his leg and found the bone infection.  He tells me that he was only on antibiotics for about 10 days for this bone infection.  He did have a PICC line placed, and was on an antibiotic infusion.  He does not know which type of antibiotic he was on, but he tells me that they change the dosage of his medicines frequently during that 10 days that he was on the medication.  He believes that he was not treated for long enough, and that he has recurrence of his infection.  Because of his previously normal blood test, he believes that obtaining blood test today would not be helpful for him.    He has independently contacted radiology, who recommended that we perform a tagged white blood cell study.  Apparently, the radiologist told him that under the \"worst case\" he could do a biopsy of the leg.    Objective:    Blood pressure (!) 159/97, pulse 87, temperature 98  F (36.7  C), temperature source Oral, resp. rate 19, weight 107.1 kg (236 lb 3.2 oz), SpO2 95 %.  Body mass index is 31.16 kg/m .    General:  Well nourished, and in no acute distress.  The vital signs are " reviewed  Extremities: no cyanosis, edema or clubbing.  The site of the previous amputation appears well-healed to me there is no specific site of tenderness.  I do not appreciate any skin lesions in this area.  Psych: Euthymic     Assessment and plan:      Traumatic amputation of left lower extremity, subsequent encounter (H)  -     INFECTIOUS DISEASE REFERRAL; Future    Is not clear to me the next best step.  Options include an MRI of his stump, or a three-phase bone scan.  It does not seem as if a tagged white blood cell study would be the appropriate choice at this point, but this was apparently recommended by a radiologist.    The patient is quite adamant that he has an infection at this point.  I think that referral to infectious disease for picking the optimal work-up for this is appropriate.  Patient is referred to infectious disease.  He will come back to see me following his work-up with infectious disease.  If this work-up is negative, we will pursue a different pain clinic for treatment of his left lower extremity pain.        Patient Instructions   Thank you for coming to [facility].  Stop at the  to get the referral to Infectious Disease  The phone number we will call with results is # [phone] (home) . If this is not the best number please call our clinic and change the number.  If you need any refills please call your pharmacy and they will contact us.  If you have any concerns about today's visit or wish to schedule another appointment please call our office during normal business hours [phone] (8-5:00 M-F)  If you have urgent medical concerns please call [phone] at any time of the day.  If you a medical emergency please call [phone]  Again thank you for choosing [facility] and please let us know how we can best partner with you to improve you and your family's health.        The patient was actively involved in the decision making process, and all the questions were  answered to their satisfaction prior to leaving.

## 2019-10-25 ENCOUNTER — COMMUNICATION - HEALTHEAST (OUTPATIENT)
Dept: ADMINISTRATIVE | Facility: CLINIC | Age: 59
End: 2019-10-25

## 2019-10-31 ENCOUNTER — OFFICE VISIT - HEALTHEAST (OUTPATIENT)
Dept: INFECTIOUS DISEASES | Facility: CLINIC | Age: 59
End: 2019-10-31

## 2019-10-31 ENCOUNTER — AMBULATORY - HEALTHEAST (OUTPATIENT)
Dept: LAB | Facility: CLINIC | Age: 59
End: 2019-10-31

## 2019-10-31 ENCOUNTER — TRANSFERRED RECORDS (OUTPATIENT)
Dept: HEALTH INFORMATION MANAGEMENT | Facility: CLINIC | Age: 59
End: 2019-10-31

## 2019-10-31 DIAGNOSIS — M86.662 OTHER CHRONIC OSTEOMYELITIS OF LEFT TIBIA (H): ICD-10-CM

## 2019-10-31 DIAGNOSIS — M79.609 AMPUTATION STUMP PAIN (H): ICD-10-CM

## 2019-10-31 DIAGNOSIS — T87.89 AMPUTATION STUMP PAIN (H): ICD-10-CM

## 2019-10-31 LAB
ANION GAP SERPL CALCULATED.3IONS-SCNC: 10 MMOL/L (ref 5–18)
BASOPHILS # BLD AUTO: 0.1 THOU/UL (ref 0–0.2)
BASOPHILS NFR BLD AUTO: 1 % (ref 0–2)
BUN SERPL-MCNC: 21 MG/DL (ref 8–22)
C REACTIVE PROTEIN LHE: 0.3 MG/DL (ref 0–0.8)
CALCIUM SERPL-MCNC: 10 MG/DL (ref 8.5–10.5)
CHLORIDE BLD-SCNC: 105 MMOL/L (ref 98–107)
CO2 SERPL-SCNC: 25 MMOL/L (ref 22–31)
CREAT SERPL-MCNC: 1.34 MG/DL (ref 0.7–1.3)
EOSINOPHIL # BLD AUTO: 0.4 THOU/UL (ref 0–0.4)
EOSINOPHIL NFR BLD AUTO: 4 % (ref 0–6)
ERYTHROCYTE [DISTWIDTH] IN BLOOD BY AUTOMATED COUNT: 11.5 % (ref 11–14.5)
GFR SERPL CREATININE-BSD FRML MDRD: 55 ML/MIN/1.73M2
GLUCOSE BLD-MCNC: 104 MG/DL (ref 70–125)
HCT VFR BLD AUTO: 47 % (ref 40–54)
HGB BLD-MCNC: 16.2 G/DL (ref 14–18)
LYMPHOCYTES # BLD AUTO: 2.4 THOU/UL (ref 0.8–4.4)
LYMPHOCYTES NFR BLD AUTO: 25 % (ref 20–40)
MCH RBC QN AUTO: 30.5 PG (ref 27–34)
MCHC RBC AUTO-ENTMCNC: 34.5 G/DL (ref 32–36)
MCV RBC AUTO: 88 FL (ref 80–100)
MONOCYTES # BLD AUTO: 0.8 THOU/UL (ref 0–0.9)
MONOCYTES NFR BLD AUTO: 9 % (ref 2–10)
NEUTROPHILS # BLD AUTO: 6 THOU/UL (ref 2–7.7)
NEUTROPHILS NFR BLD AUTO: 62 % (ref 50–70)
PLATELET # BLD AUTO: 274 THOU/UL (ref 140–440)
PMV BLD AUTO: 6.9 FL (ref 7–10)
POTASSIUM BLD-SCNC: 4.8 MMOL/L (ref 3.5–5)
RBC # BLD AUTO: 5.32 MILL/UL (ref 4.4–6.2)
SODIUM SERPL-SCNC: 140 MMOL/L (ref 136–145)
WBC: 9.7 THOU/UL (ref 4–11)

## 2019-11-01 ENCOUNTER — TRANSFERRED RECORDS (OUTPATIENT)
Dept: HEALTH INFORMATION MANAGEMENT | Facility: CLINIC | Age: 59
End: 2019-11-01

## 2019-11-01 ENCOUNTER — HOSPITAL ENCOUNTER (OUTPATIENT)
Dept: MRI IMAGING | Facility: CLINIC | Age: 59
Discharge: HOME | End: 2019-11-01
Attending: INTERNAL MEDICINE

## 2019-11-01 DIAGNOSIS — M86.662 OTHER CHRONIC OSTEOMYELITIS OF LEFT TIBIA (H): ICD-10-CM

## 2019-11-04 ENCOUNTER — COMMUNICATION - HEALTHEAST (OUTPATIENT)
Dept: INFECTIOUS DISEASES | Facility: CLINIC | Age: 59
End: 2019-11-04

## 2019-11-20 ENCOUNTER — OFFICE VISIT (OUTPATIENT)
Dept: FAMILY MEDICINE | Facility: CLINIC | Age: 59
End: 2019-11-20
Payer: COMMERCIAL

## 2019-11-20 VITALS
DIASTOLIC BLOOD PRESSURE: 88 MMHG | BODY MASS INDEX: 30.48 KG/M2 | WEIGHT: 231 LBS | RESPIRATION RATE: 16 BRPM | HEART RATE: 96 BPM | OXYGEN SATURATION: 97 % | SYSTOLIC BLOOD PRESSURE: 134 MMHG | TEMPERATURE: 97.4 F

## 2019-11-20 DIAGNOSIS — R35.1 BENIGN PROSTATIC HYPERPLASIA WITH NOCTURIA: Primary | ICD-10-CM

## 2019-11-20 DIAGNOSIS — N40.1 BENIGN PROSTATIC HYPERPLASIA WITH NOCTURIA: Primary | ICD-10-CM

## 2019-11-20 DIAGNOSIS — B07.8 COMMON WART: ICD-10-CM

## 2019-11-20 DIAGNOSIS — M79.661 PAIN OF RIGHT LOWER LEG: ICD-10-CM

## 2019-11-20 RX ORDER — TAMSULOSIN HYDROCHLORIDE 0.4 MG/1
0.4 CAPSULE ORAL DAILY
Qty: 30 CAPSULE | Refills: 1 | Status: SHIPPED | OUTPATIENT
Start: 2019-11-20 | End: 2020-03-18

## 2019-11-20 NOTE — PROGRESS NOTES
There are no exam notes on file for this visit.  Chief Complaint   Patient presents with     Follow Up     follow up on last visit        Subjective: The patient comes in today with several complaints.    His major complaint is that he continues to have pain in the stump of his left leg.  He did have an MRI done.  He did discuss this and follow-up with the infectious disease doctor.  The infectious disease doctor recommended that he see a neurologist for treatment of a neuroma.  The patient would like my opinion about this.    The patient has a wart in his left palm on the thenar eminence that he would like me to freeze again.    The patient has nocturia.  He also has some hesitancy and urinary is seen advertisements on TV for prostate supplements.  He wonders what my opinion of them is.  Patient and I discussed that over-the-counter prostate supplements are unproven and unregulated.  The patient is not sure, but believes that his father might of had prostate cancer at advanced age.    The patient and I discussed the pluses and minuses of digital rectal examination as well as of prostatic specific antigen.    The patient would like to discuss his insurance with our .    Objective:    Blood pressure 134/88, pulse 96, temperature 97.4  F (36.3  C), temperature source Oral, resp. rate 16, weight 104.8 kg (231 lb), SpO2 97 %.  Body mass index is 30.48 kg/m .    General:  Well nourished, and in no acute distress.  The vital signs are reviewed  Extremities: no cyanosis, edema or clubbing.  Examination of his left stump shows no evidence of cellulitis.  There is no evidence of ulceration.  On his left palm there is a large count less on the hyporthenar eminence and proximal palm.  Immediately lateral to this towards the thenar eminence is a small lesion consistent with a common viral wart.      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  The MRI showed a shallow ulceration at  "distal stump with anterior inferior stump cellulitis.  There was no convincing MR evidence for osteomyelitis.  There were findings consistent with common peroneal and tibial neuromas.  There was mild synovitis in the knee with patellar maltracking.  There was no evidence for knee fracture.  There was a chronic healed oblique fracture in the fibular shaft near the osteotomy.  The size of the neuroma along the current common peroneal nerve was 10 x 8 x 8 mm.  The size of the tibial nerve neuroma was 13 x 8 x 12 mm      Assessment and plan:      Benign prostatic hyperplasia with nocturia  -     tamsulosin (FLOMAX) 0.4 MG capsule; Take 1 capsule (0.4 mg) by mouth daily    I have offered the patient referral to urology.  He declines this.  The patient does not like to take medications regularly.  He wonders if there is a \"nuclear\" pill that will make his prostate get smaller for some time.  I told him that this is not available.  I told him that there were surgical options that he could explore with urology.  He is not interested in this.  He will take the tamsulosin for a few weeks and then decide whether or not it is worth it to continue taking medication every day as compared to his relief of symptoms.    We did discuss prostatic cancer, and we discussed the issues of false positives and false negatives for PSA as well as JOLENE.  The patient declines the PSA at this time.    Pain of right lower leg    The patient and I discussed neuromas.  [facility] did not help him.  I have recommended that he return to the pain clinic.  He is quite hesitant to do this.  He would like me to investigate other opportunities for treatment of these neuromas.  I will do so, and get back to the patient.    Common wart  -     DESTRUCT BENIGN LESION, UP TO 14    The wart was frozen in a freeze thaw freeze cycle.  An adequate collar of frozen tissue was obtained around " the wart.  I discussed that palmar warts can be difficult to treat.  I offered the patient referral to dermatology which he declines.       A total of 44 minutes was spent in face to face contact with patient with > 50% in counseling and coordination of care.  We discussed the above items.  Much of our time was spent discussing BPH, and its treatment.  We also discussed treatment of neuromas extensively.  The time did not include time spent with cryotherapy    Patient Instructions     Patient Education     BPH (Enlarged Prostate)  The prostate is a gland at the base of the bladder. As some men get older, the prostate may get bigger in size. This problem is called benign prostatic hyperplasia (BPH). BPH puts pressure on the urethra. This is the tube that carries urine from the bladder to the penis. It may interfere with the flow of urine. It may also keep the bladder from emptying fully.    Symptoms of BPH include trouble starting urination and feeling as though the bladder isn t emptying all the way. It also includes a weak urine stream, dribbling and leaking of urine, and frequent and urgent urination (especially at night). BPH can increase the risk of urinary infections. It can also block off urine flow completely. If this occurs, a thin tube (catheter) may be passed into the bladder to help drain urine.  If symptoms are mild, no treatment may be needed right now. If symptoms are more severe, treatment is likely needed. The goal of treatment is to improve urine flow and reduce symptoms. Treatments can include medicine and procedures. Your healthcare provider will discuss treatment options with you as needed.  Home care  The following guidelines will help you care for yourself at home:    Urinate as soon as you feel the urge. Don't try to hold your urine.    Don't limit your fluid intake during the day. Drink 6 to 8 glasses of water or liquids a day. This prevents bacteria from building up in the bladder.    Avoid  drinking fluids after dinner to help reduce urination during the night.    Avoid medicines that can worsen your symptoms. These include certain cold and allergy medicines and antidepressants. Diuretics used for high blood pressure can also worsen symptoms. Talk to your doctor about the medicines you take. Other choices may work better for you.  Prostate cancer screening  BPH does not increase the risk of prostate cancer. But because prostate cancer is a common cancer in men, screening is sometimes recommended. This may help detect the cancer in its early stages when treatment is most effective. Factors that can increase the risk of prostate cancer include being -American or having a father or brother who had prostate cancer. A high-fat diet may also increase the risk of prostate cancer. Talk to your healthcare provider to see whether you should be screened for prostate cancer.  Follow-up care  Follow up with your healthcare provider, or as advised  To learn more, go to:    National Kidney & Urologic Diseases Information Clearinghouse  kidney.niddk.nih.gov, 222.969.9751  When to seek medical advice  Call your healthcare provider right away if any of these occur:    Fever of 100.4 F (38.0 C) or higher, or as advised    Unable to pass urine for 8 hours    Increasing pressure or pain in your bladder (lower abdomen)    Blood in the urine    Increasing low back pain, not related to injury    Symptoms of urinary infection (increased urge to urinate, burning when passing urine, foul-smelling urine)  Date Last Reviewed: 7/1/2016 2000-2018 [employer]. [address] [place] [address], [place] [address]. All rights reserved. This information is not intended as a substitute for professional medical care. Always follow your healthcare professional's instructions.               The patient was actively involved in the decision making process, and all the questions were answered to their satisfaction prior to  leaving.
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
## 2019-11-26 ENCOUNTER — TELEPHONE (OUTPATIENT)
Dept: FAMILY MEDICINE | Facility: CLINIC | Age: 59
End: 2019-11-26

## 2019-11-26 NOTE — TELEPHONE ENCOUNTER
[facility] [[email or web address] Family Medicine phone call message- general phone call:    Reason for call: Dr Antony was supposed to have been getting back to him regarding his appt last week. It is regarding a problem with his amputation.    Return call needed: Yes    OK to leave a message on voice mail? Yes    Primary language: English      needed? No    Call taken on November 26, 2019 at 12:37 PM by Basilio Shankar

## 2019-11-27 NOTE — TELEPHONE ENCOUNTER
Called pt on behalf of Dr. Antony, let him know that he did place a call to surgery colleagues and it waiting for a call back to discuss. Pt wanted to reiterate that steroid treatments didn't work, and he thinks surgery is the only options.    He also notes in February his insurance is changing, so he would like to move things forward before then if possible. Routed to Dr. Antony. ./MAGGIE

## 2019-12-02 ENCOUNTER — TELEPHONE (OUTPATIENT)
Dept: FAMILY MEDICINE | Facility: CLINIC | Age: 59
End: 2019-12-02

## 2019-12-02 DIAGNOSIS — T87.30: Primary | ICD-10-CM

## 2019-12-02 NOTE — TELEPHONE ENCOUNTER
[facility] Family Medicine phone call message- general phone call:    Reason for call: Dafne URGENTLY needs Dr Antony to give her a call regarding a referral for the Orthopedic doctor.  Jesus is scheduled for surgery this Friday and he will need the referral and MRI results prior to his surgery for insurance.  Please call tomorrow when you are in.    Return call needed: Yes    OK to leave a message on voice mail? Yes    Primary language: English      needed? No    Call taken on December 2, 2019 at 3:06 PM by Basilio Shankar

## 2019-12-02 NOTE — TELEPHONE ENCOUNTER
Pt's daughter called stating that she had found a surgeon that specializes in neuromas and they have an appt (NOT surgery) set up Friday. They need a referral sent to [facility] in [place] (p: [phone]). Daughter additionally requesting a disc of the pt's MRI.     Called [facility], transferred to the imaging department. Spoke with Ya Sin, who noted she could share images with [facility] directly, and was able to complete this electronically.     Pt's daughter requesting phone call when referral is sent over. Routed to Dr. Antony. ./MAGGIE

## 2019-12-03 NOTE — TELEPHONE ENCOUNTER
Referral, face sheet faxed to [facility] (f: [phone]). Confirmed with Tria no additional information needed. Also aware images should be shared electronically.     Called pt's daughter Dafne per request and updated her. ./LR

## 2019-12-03 NOTE — TELEPHONE ENCOUNTER
I have not yet received a return call from [facility] surgery.  Family has found a specialist that they want to see.  Referral placed

## 2019-12-06 ENCOUNTER — TELEPHONE (OUTPATIENT)
Dept: ANESTHESIOLOGY | Facility: CLINIC | Age: 59
End: 2019-12-06

## 2019-12-06 NOTE — TELEPHONE ENCOUNTER
[facility] Call Center    Phone Message    May a detailed message be left on voicemail: yes    Reason for Call: Other: pt's daughter, Dafne, is reporting to Dr. Martine Townsend. She goes to all of his appts, daughter helps with everything. Dafne is requesting Dr. Townsend's team to call her instead of calling pt back regarding the phone call at 12:11pm Today 12/6/19.  pt does not have voice mail capability on his cell ph. So call Dafne back instead to respond to the msg on 12/6/19 at 12:11 ph. Call Dafne at [phone].  Thank you.      Action Taken: Message routed to:  [facility] ([facility]  Adult Chronic Pain Clinic

## 2019-12-06 NOTE — TELEPHONE ENCOUNTER
[facility] Call Center    Phone Message    May a detailed message be left on voicemail: yes    Reason for Call: Other: Pt is calling in in a lot of pain. Pt did schedule the first available of 1/9/20, but pt is wanting to discuss any options that he may have. Pt had an MRI about two weeks ago and it is showing some neuroma overgrowth. Please advise      Action Taken: Message routed to:  [facility] Pain

## 2019-12-10 NOTE — TELEPHONE ENCOUNTER
LPN called and spoke to the Pt's Daughter, Concha, per request.   The pt was needing a follow up appointment to discuss surgical intervention for possible Neuromas at their stump site.The Pt has been seen in the clinic previously by Dr. Townsend and ZELALEM Rodriguez. Pt has had several injections with Dr. Townsend; Concha states that the steroid injections have not been beneficial, and he had increased pain for months following the RFA.   Pt has recently gotten an MRI due to increased pain, which showed signs of Neuromas at the Amputation site. Pt reported back to their PCP- who referred pt to an Orthopedic surgeon.   Pt had an appointment at [facility]- who did not feel comfortable moving forward with Neuroma Resection without the pt following up with Dr. Townsend first.     Pt was assisted to schedule the first available appointment with Dr. Townsend on 12/19/19.     LPN informed Concha that they will send a messge to Dr. Townsend to update them of the current situation- and will get back to the Pt/Concha if they have further recommendations.     Abril Snowden LPN

## 2019-12-10 NOTE — TELEPHONE ENCOUNTER
Per Dr. Townsend's Recommendation- he would like to see pt back in clinic on 12/19/19, and will discuss that they do not think Neuroma Resection will be beneficial- but that the pt may find benefit with SCS therapy.     TERRENCE updated Concha, who stated that they can discuss with it with the provider at the appointment.     Abril Snowden LPN

## 2019-12-19 ENCOUNTER — OFFICE VISIT (OUTPATIENT)
Dept: ANESTHESIOLOGY | Facility: CLINIC | Age: 59
End: 2019-12-19
Attending: ANESTHESIOLOGY
Payer: COMMERCIAL

## 2019-12-19 VITALS
RESPIRATION RATE: 16 BRPM | OXYGEN SATURATION: 97 % | BODY MASS INDEX: 31.66 KG/M2 | DIASTOLIC BLOOD PRESSURE: 100 MMHG | SYSTOLIC BLOOD PRESSURE: 154 MMHG | TEMPERATURE: 98.3 F | WEIGHT: 240 LBS | HEART RATE: 100 BPM

## 2019-12-19 DIAGNOSIS — M79.2 NEUROPATHIC PAIN OF LEFT LOWER EXTREMITY: ICD-10-CM

## 2019-12-19 DIAGNOSIS — T87.30: Primary | ICD-10-CM

## 2019-12-19 PROCEDURE — G0463 HOSPITAL OUTPT CLINIC VISIT: HCPCS | Mod: ZF

## 2019-12-19 RX ORDER — OXYCODONE HYDROCHLORIDE 5 MG/1
2.5-5 TABLET ORAL DAILY PRN
Qty: 15 TABLET | Refills: 0 | Status: SHIPPED | OUTPATIENT
Start: 2019-12-19 | End: 2019-12-20

## 2019-12-19 ASSESSMENT — PAIN SCALES - GENERAL: PAINLEVEL: MODERATE PAIN (4)

## 2019-12-19 NOTE — LETTER
12/19/2019       RE: Jesus Presley  [address] [place] [address]     Dear Colleague,    Thank you for referring your patient, Jesus Presley, to the [facility] at [facility]. Please see a copy of my visit note below.    [facility] : Progress Note    Date of visit: 12/19/2019    Interval history:    Jesus Presley is a 59 year old male,  known to me for left lower extremity amputation stump pain.  His pain is sharp or burning, 6 out of 10.  Pain is aggravated by use of prosthesis.  Sometimes he has persistent pain after the use of prosthesis, which lasts for several days.  Patient had stump neuroma injection with local anesthetic by me x2.  After reasonable pain relief we proceeded with radiofrequency ablation of the neuroma, which did not help his symptoms.  He states that indeed it increased his pain for several months  after the ablation.As his pain continued, he looked for alternate options.   He met with a orthopedic surgeon outside [facility], who offered surgical resection of the neuroma.  However, surgeon told him that he must get a note from a pain clinic that neuroma resection is recommended.  The patient came to me  to discuss further options for his symptoms.  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    [place] Prescription Monitoring Program:   Reviewed. No concerns     Review of Systems:  The 14 system ROS was reviewed and was negative except what is documented above and as follows.  Any bowel or bladder problems: none  Mood: okay    Physical Exam:  Vitals:    12/19/19 0840 12/19/19 0846   BP: (!) 158/101 (!) 154/100   Pulse: 100    Resp: 16    Temp: 98.3  F (36.8  C)    TempSrc: Oral    SpO2: 97%    Weight: 108.9 kg (240 lb)        General:   Awake in no apparent distress.   Eyes: Sclerae  are anicteric. PERRLA, EOMI   Neck: supple, no masses.   Lungs: unlabored.   Heart: regular rate and rhythm  Musculoskeletal: Left lower extremity on the prosthesis.      Medications:  Current Outpatient Medications   Medication Sig Dispense Refill     aspirin-acetaminophen-caffeine (EXCEDRIN MIGRAINE) 250-250-65 MG per tablet Take 1 tablet by mouth every 6 hours as needed for headaches       Ibuprofen (ADVIL PO) Take 600 mg by mouth every 8 hours as needed for moderate pain       tamsulosin (FLOMAX) 0.4 MG capsule Take 1 capsule (0.4 mg) by mouth daily (Patient not taking: Reported on 12/19/2019) 30 capsule 1       Analgesic Medications:   Medications related to Pain Management (From now, onward)    None             LABORATORY VALUES:   No results for input(s): NA, POTASSIUM, CHLORIDE, CO2, ANIONGAP, GLC, BUN, CR, JACK in the last 25037 hours.    CBC RESULTS: No results for input(s): WBC, RBC, HGB, HCT, MCV, MCH, MCHC, RDW, PLT in the last 53912 hours.    Most Recent 3 INR's:No lab results found.        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    PLAN:    1. Medications.     Oxycodone 5 mg every day prn.     2. Interventional procedures:    I have discussed about multiple interventional options for his left lower extremity neuroma.  Discussed about peripheral nerve stimulation.  A referral can be placed outside hospital if the patient is interested.     Discussed about spinal cord dorsal column stimulation (DCS) trial. This is a technique used in the management of certain chronic pain syndromes. Through an implanted electrode, electricity is delivered to the posterior elements of the spinal cord in order to relieve the pain associated with failed-back surgery syndrome (FBSS) and complex regional pain syndrome (CRPS). The aim of DCS  is to reduce the intensity, duration, and  frequency of pain associated with the chronic pain conditions. The DCS system is controlled by the patient with the use of the generator remote control. Risks and benefits of the procedure including failure to control pain, nerve injury, spinal cord injury, bleeding, infection, dural tear, post dural puncture headache discussed with the patient.  I discussed with him in detail that there is insufficient evidence for beneficial effects of surgical neuroma resection.  After the resection of the neuroma it is possible that his pain can get worse and the neuroma may recur.    3. Labs and imaging: None needed for pain management.       4. Rehab:  Advised to perform home exercise using [employer] videos  [email or web address].  The patient is also encouraged to stay active as tolerated.     5. Psychology: No current needs.     6. Integrated medicine: We discussed acupuncture therapy.  The patient is not interested.    7. Disposition:  The patient will follow up in our outpatient clinic in 8 weeks or earlier if clinically indicated.     Assessment will be ongoing with changes in treatment as indicated.  Benefits/risks/alternatives to treatment have been reviewed and the patient has been instructed to contact this office if they have any questions or concerns.  This plan of care has been discussed with the patient and the patient is in agreement.     Martine Townsend MD, PHD

## 2019-12-19 NOTE — PROGRESS NOTES
[facility] : Progress Note    Date of visit: 12/19/2019    Interval history:    Jesus Presley is a 59 year old male,  known to me for left lower extremity amputation stump pain.  His pain is sharp or burning, 6 out of 10.  Pain is aggravated by use of prosthesis.  Sometimes he has persistent pain after the use of prosthesis, which lasts for several days.  Patient had stump neuroma injection with local anesthetic by me x2.  After reasonable pain relief we proceeded with radiofrequency ablation of the neuroma, which did not help his symptoms.  He states that indeed it increased his pain for several months  after the ablation.As his pain continued, he looked for alternate options.   He met with a orthopedic surgeon outside [facility], who offered surgical resection of the neuroma.  However, surgeon told him that he must get a note from a pain clinic that neuroma resection is recommended.  The patient came to me  to discuss further options for his symptoms.  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      [place] Prescription Monitoring Program:   Reviewed. No concerns     Review of Systems:  The 14 system ROS was reviewed and was negative except what is documented above and as follows.  Any bowel or bladder problems: none  Mood: okay    Physical Exam:  Vitals:    12/19/19 0840 12/19/19 0846   BP: (!) 158/101 (!) 154/100   Pulse: 100    Resp: 16    Temp: 98.3  F (36.8  C)    TempSrc: Oral    SpO2: 97%    Weight: 108.9 kg (240 lb)        General:   Awake in no apparent distress.   Eyes: Sclerae are anicteric. PERRLA, EOMI   Neck: supple, no masses.   Lungs: unlabored.   Heart: regular rate and rhythm  Musculoskeletal: Left lower extremity on the prosthesis.      Medications:  Current Outpatient Medications   Medication Sig Dispense Refill     aspirin-acetaminophen-caffeine (EXCEDRIN  MIGRAINE) 250-250-65 MG per tablet Take 1 tablet by mouth every 6 hours as needed for headaches       Ibuprofen (ADVIL PO) Take 600 mg by mouth every 8 hours as needed for moderate pain       tamsulosin (FLOMAX) 0.4 MG capsule Take 1 capsule (0.4 mg) by mouth daily (Patient not taking: Reported on 12/19/2019) 30 capsule 1       Analgesic Medications:   Medications related to Pain Management (From now, onward)    None             LABORATORY VALUES:   No results for input(s): NA, POTASSIUM, CHLORIDE, CO2, ANIONGAP, GLC, BUN, CR, JACK in the last 07177 hours.    CBC RESULTS: No results for input(s): WBC, RBC, HGB, HCT, MCV, MCH, MCHC, RDW, PLT in the last 73865 hours.    Most Recent 3 INR's:No lab results found.        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    PLAN:    1. Medications.     Oxycodone 5 mg every day prn.     2. Interventional procedures:    I have discussed about multiple interventional options for his left lower extremity neuroma.  Discussed about peripheral nerve stimulation.  A referral can be placed outside hospital if the patient is interested.     Discussed about spinal cord dorsal column stimulation (DCS) trial. This is a technique used in the management of certain chronic pain syndromes. Through an implanted electrode, electricity is delivered to the posterior elements of the spinal cord in order to relieve the pain associated with failed-back surgery syndrome (FBSS) and complex regional pain syndrome (CRPS). The aim of DCS  is to reduce the intensity, duration, and frequency of pain associated with the chronic pain conditions. The DCS system is controlled by the patient with the use of the generator remote control. Risks and benefits of the procedure including failure to control pain, nerve injury, spinal cord injury, bleeding, infection, dural tear, post  dural puncture headache discussed with the patient.    I discussed with him in detail that there is insufficient evidence for beneficial effects of surgical neuroma resection.  After the resection of the neuroma it is possible that his pain can get worse and the neuroma may recur.    3. Labs and imaging: None needed for pain management.       4. Rehab:  Advised to perform home exercise using [place] videos  [email or web address].  The patient is also encouraged to stay active as tolerated.     5. Psychology: No current needs.     6. Integrated medicine: We discussed acupuncture therapy.  The patient is not interested.    7. Disposition:  The patient will follow up in our outpatient clinic in 8 weeks or earlier if clinically indicated.       Assessment will be ongoing with changes in treatment as indicated.  Benefits/risks/alternatives to treatment have been reviewed and the patient has been instructed to contact this office if they have any questions or concerns.  This plan of care has been discussed with the patient and the patient is in agreement.     Martine Townsend MD, PHD

## 2019-12-19 NOTE — TELEPHONE ENCOUNTER
[facility] Call Center    Phone Message    May a detailed message be left on voicemail: yes    Reason for Call: Medication Refill Request    Has the patient contacted the pharmacy for the refill? Yes   Name of medication being requested: oxyCODONE (ROXICODONE) 5 MG tablet  Provider who prescribed the medication: Dr. Townsend  [facility]arm[facility]  Date medication is needed: ASAP   Pharmacy is requesting an 8 day supply of oxyCODONE (ROXICODONE) 5 MG tablet for the Pt. Pharmacy states the insurance only allows a 7 days fill this medication. Please reach out to pharmacy.      Action Taken: Message routed to:  [facility] ([facility]): Pain

## 2019-12-19 NOTE — PATIENT INSTRUCTIONS
1. One time prescription for oxycodone 5mg for 15 tablets.  Take 0.5-1 tablet daily as needed for pain.  You will need to follow up with your primary care provider to discuss continuation of this medication.  Dr. Townsend will not provide refills for this medication.         Follow up: As needed           To speak with a nurse, schedule/reschedule/cancel a clinic appointment, or request a medication refill call: [phone]     You can also reach us by [employer]: [email or web address]    For refills, please call on Monday, 1 week before your medication runs out. The doctors are not always in clinic, so this gives us time to get your prescriptions ready.  Please let us know the name of the medication you are requesting a refill of.

## 2019-12-19 NOTE — NURSING NOTE
"Oncology Rooming Note    December 19, 2019 8:45 AM   Jesus Presley is a 59 year old male who presents for:    Chief Complaint   Patient presents with     Oncology Clinic Visit     Return Traumatic amputation of lower extremity     Initial Vitals: BP (!) 158/101   Pulse 100   Temp 98.3  F (36.8  C) (Oral)   Resp 16   Wt 108.9 kg (240 lb)   SpO2 97%   BMI 31.66 kg/m   Estimated body mass index is 31.66 kg/m  as calculated from the following:    Height as of 6/1/18: 1.854 m (6' 1\").    Weight as of this encounter: 108.9 kg (240 lb). Body surface area is 2.37 meters squared.  Moderate Pain (4) Comment: left leg   No LMP for male patient.  Allergies reviewed: Yes  Medications reviewed: Yes    Medications: Medication refills not needed today.  Pharmacy name entered into [employer]: [facility] - [place], [place] - [address]    Clinical concerns: left leg stump pain       Trinidad Peter [facility]              "

## 2019-12-19 NOTE — TELEPHONE ENCOUNTER
I called and spoke with the Pharmacy staff and they stated that the pt's insurance only allows a 7 day fill for first time pain medications.    Pharmacy gave the pt 7 tablets and are requesting a new prescription for the remaining 8 tablets that they will give to the pt in 6 days.    I informed the pharmacy that I would update [facility] and we will send in an Rx for the other 8 tablets.    Pharmacy staff stated verbal understanding and had no further questions or concerns.    Kamini Blackwood, CMA

## 2019-12-20 PROBLEM — T87.30: Status: ACTIVE | Noted: 2019-12-20

## 2019-12-20 RX ORDER — OXYCODONE HYDROCHLORIDE 5 MG/1
2.5-5 TABLET ORAL DAILY PRN
Qty: 8 TABLET | Refills: 0 | Status: SHIPPED | OUTPATIENT
Start: 2019-12-26 | End: 2020-03-18

## 2020-03-13 NOTE — TELEPHONE ENCOUNTER
Could not leave a message there was no voicemail, it just kept ringing, will call back later. Cindi Retana, A         normal...

## 2020-03-18 ENCOUNTER — OFFICE VISIT (OUTPATIENT)
Dept: FAMILY MEDICINE | Facility: CLINIC | Age: 60
End: 2020-03-18
Payer: COMMERCIAL

## 2020-03-18 ENCOUNTER — RECORDS - HEALTHEAST (OUTPATIENT)
Dept: ADMINISTRATIVE | Facility: OTHER | Age: 60
End: 2020-03-18

## 2020-03-18 ENCOUNTER — HOSPITAL ENCOUNTER (OUTPATIENT)
Dept: ULTRASOUND IMAGING | Facility: CLINIC | Age: 60
Discharge: HOME | End: 2020-03-18
Attending: FAMILY MEDICINE

## 2020-03-18 VITALS
SYSTOLIC BLOOD PRESSURE: 178 MMHG | OXYGEN SATURATION: 97 % | RESPIRATION RATE: 24 BRPM | BODY MASS INDEX: 33.09 KG/M2 | DIASTOLIC BLOOD PRESSURE: 109 MMHG | TEMPERATURE: 97.5 F | WEIGHT: 250.8 LBS | HEART RATE: 77 BPM

## 2020-03-18 DIAGNOSIS — N40.1 BENIGN PROSTATIC HYPERPLASIA WITH LOWER URINARY TRACT SYMPTOMS, SYMPTOM DETAILS UNSPECIFIED: ICD-10-CM

## 2020-03-18 DIAGNOSIS — R60.9 EDEMA, UNSPECIFIED TYPE: Primary | ICD-10-CM

## 2020-03-18 DIAGNOSIS — I10 ESSENTIAL HYPERTENSION: ICD-10-CM

## 2020-03-18 DIAGNOSIS — R60.9 EDEMA: ICD-10-CM

## 2020-03-18 DIAGNOSIS — B07.0 PLANTAR WART: ICD-10-CM

## 2020-03-18 LAB
BNP SERPL-MCNC: <10 PG/ML (ref 0–52)
BUN SERPL-MCNC: 17.6 MG/DL (ref 7–21)
CALCIUM SERPL-MCNC: 10 MG/DL (ref 8.5–10.1)
CHLORIDE SERPLBLD-SCNC: 102.9 MMOL/L (ref 98–110)
CO2 SERPL-SCNC: 23.3 MMOL/L (ref 20–32)
CREAT SERPL-MCNC: 1 MG/DL (ref 0.7–1.3)
GFR SERPL CREATININE-BSD FRML MDRD: 79 ML/MIN/1.7 M2
GLUCOSE SERPL-MCNC: 106.3 MG'DL (ref 70–99)
POTASSIUM SERPL-SCNC: 4.2 MMOL/L (ref 3.2–4.6)
SODIUM SERPL-SCNC: 137.9 MMOL/L (ref 132–142)

## 2020-03-18 NOTE — LETTER
2020      Jesus Presley  [address] [place] [address]        Dear CARLOS ALBERTO [employer]   [phone]    [address] [address]    Appointment:  TODAY   Arrival Time:  3:15pm    Please bring a copy of your insurance card and photo ID      Sincerely,    Karissa

## 2020-03-18 NOTE — LETTER
March 19, 2020      Jesus Presley  [address] [place] [address]        Dear ,    We are writing to inform you of your test results.    Your BNP is normal. This shows the swelling is not from your heart     Resulted Orders   Basic Metabolic Panel ([facility])  - Results < 1 hr   Result Value Ref Range    Urea Nitrogen 17.6 7.0 - 21.0 mg/dL    Calcium 10.0 8.5 - 10.1 mg/dL    Chloride 102.9 98.0 - 110.0 mmol/L    Carbon Dioxide 23.3 20.0 - 32.0 mmol/L    Creatinine 1.0 0.7 - 1.3 mg/dL    Glucose 106.3 (H) 70.0 - 99.0 mg'dL    Potassium 4.2 3.2 - 4.6 mmol/L    Sodium 137.9 132.0 - 142.0 mmol/L    GFR Estimate 79.0 >60.0 mL/min/1.7 m2    GFR Estimate If Black >90 >60.0 mL/min/1.7 m2   BNP ([facility])   Result Value Ref Range    BNP <10 0 - 52 pg/mL    Narrative    Test performed by:  [facility]  [address] [place] [address]       If you have any questions or concerns, please call the clinic at the number listed above.       Sincerely,        Cristi Antony MD

## 2020-03-18 NOTE — PATIENT INSTRUCTIONS
Patient Education   Coping with Edema  What is edema?  Edema is the build-up of fluid in the body, which causes swelling. Swelling most commonly occurs in the feet, ankles, lower legs or hands.  Swelling can occur in the belly or chest may be a sign of a more severe problem.  Certain medicines or conditions can make the swelling worse.  Symptoms include:    Feet and lower legs get larger when you sit or walk.    Hands feel tight when you make a fist.    When you push on the skin, skin stays dented.    Shiny, tight skin.    Fast weight gain.  How is it treated?  Your care team may give you a medicine to reduce the swelling.  They may also suggest that you meet with a dietitian. He or she can help with food choices to reduce the swelling.  What can I do about the swelling?    Place your feet above your heart 3 times a day: Sit with your feet up on a stool with a pillow. Sit on the bed or couch with two pillows under your feet.    Do not stand for long periods of time.    Wear loose-fitting clothes.    Do not cross your legs.    Reduce the salt in your diet.   These foods are high in salt:  ? Chips, soup  ? Frozen meals, TV dinners  ? Payan, lunch meat, ham  ? Sauces (soy, canned spaghetti sauce)    Walk or do other exercise.    Wear compression stockings.    Drink water as normal.    Weigh yourself every day at the same time to keep track of weight gain.  When should I call my care team?  Call your care team if:    You have a hard time breathing.    You gained 5 pounds or more in 1 week.    Your hands or feet feel cold when you touch them.    You are peeing very little or not at all.    Swelling is moving up your arms or legs.    Your tongue is swelling.    You cannot eat for more than a day  If you have any side effects, call us. We can help you manage these problems.  For more information,  see:  www.[facility].com  www.cancer.org/treatment/treatmentsandsideeffects/physicalsideeffects/dealingwithsymptomsathome  https://www.cancer.gov/publications/patient-education/chemo-and-you  Comments:  __________________________________________  __________________________________________  __________________________________________  __________________________________________  __________________________________________  __________________________________________  __________________________________________  For informational purposes only. Not to replace the advice of your health care provider. Copyright   2014 [employer]. All rights reserved. Clinically reviewed by Celena Jordan, Oncology Department. [employer] 528334 - REV 08/19.        Patient Education     Discharge Instructions for High Blood Pressure (Hypertension)  You have been diagnosed with high blood pressure (also called hypertension). This means the force of blood against your artery walls is too strong. It also means your heart is working hard to move blood. High blood pressure usually has no symptoms, but over time, it can cause serious health problems. High blood pressure raises your risk for heart attack, stroke, heart failure, kidney disease, and blindness. With help from your doctor, you can manage your blood pressure and protect your health.  Blood pressure measurements are given as 2 numbers. Systolic blood pressure is the upper number. This is the pressure when the heart contracts. Diastolic blood pressure is the lower number. This is the pressure when the heart relaxes between beats.  Blood pressure is categorized as normal, elevated, or stage 1 or stage 2 high blood pressure:    Normal blood pressure is systolic of less than 120 and diastolic of less than 80 (120/80)    Elevated blood pressure is systolic of 120 to 129 and diastolic less than 80    Stage 1 high blood pressure is systolic is 130 to 139 or diastolic between 80  "to 89    Stage 2 high blood pressure is when systolic is 140 or higher or the diastolic is 90 or higher  Taking medicine    Learn to take your own blood pressure. Keep a record of your results. Ask your doctor which readings mean that you need medical attention.    Take your blood pressure medicine exactly as directed. Don t skip doses. Missing doses can cause your blood pressure to get out of control.    If you do miss a dose (or doses) check with your healthcare provider about what to do.    Don't take medicines that contain heart stimulants, including over-the-counter medicines. Check for warnings about high blood pressure on the label. Ask the pharmacist before purchasing something you haven't used before    Check with your doctor or pharmacist before taking a decongestant. Some decongestants can worsen high blood pressure.  Lifestyle changes    Maintain a healthy weight. Get help to lose any extra pounds.    Cut back on salt.  ? Limit canned, dried, packaged, and fast foods.  ? Don t add salt to your food at the table.  ? Season foods with herbs instead of salt when you cook.  ? Request no added salt when you go to a restaurant.  ? The American Heart Association (AHA) says the \"ideal\" amount of sodium is no more than 1,500 mg a day.  But because Americans eat so much salt, you can make a positive change by cutting back to even 2,400 mg of sodium a day.     Follow the DASH (Dietary Approaches to Stop Hypertension) eating plan. This plan recommends vegetables, fruits, whole gains, and other heart healthy foods.    Begin an exercise program. Ask your healthcare provider how to get started. The AHA recommends aerobic exercise 3 to 4 times a week for an average of 40 minutes at a time, with your provider's approval. Simple activities like walking or gardening can help.    Break the smoking habit. Enroll in a stop-smoking program to improve your chances of success. Ask your healthcare provider about programs and " medicines to help you stop smoking.    Limit drinks that contain caffeine such as coffee, black or green tea, and cola to 2 per day.    Never take stimulants such as amphetamines or cocaine. These drugs can be deadly for someone with high blood pressure.    Control your stress. Learn ways to manage stress.    Limit alcohol to no more than 1 drink a day for women and 2 drinks a day for men.  Follow-up care  Make a follow-up appointment as directed.  When to call your healthcare provider  Call your healthcare provider right away if you have any of the following:    Chest pain or shortness of breath (call 911)    Moderate to severe headache    Weakness in the muscles of your face, arms, or legs    Trouble speaking    Extreme drowsiness    Confusion    Fainting or dizziness    Pulsating or rushing sound in your ears    Unexplained nosebleed    Weakness, tingling, or numbness of your face, arms, or legs    Change in vision    Blood pressure measured at home that is greater than 180/110   Date Last Reviewed: 4/27/2016 2000-2019 [employer]. [address] [place] [address]. All rights reserved. This information is not intended as a substitute for professional medical care. Always follow your healthcare professional's instructions.           Patient Education     What is High Blood Pressure?  High blood pressure (also called hypertension) is known as the  silent killer.  This is because most of the time it doesn t cause symptoms. In fact, many people don t know they have it until other problems develop. In most cases, high blood pressure often requires lifelong treatment.  Understanding blood pressure  The circulatory system is made up of the heart and blood vessels that carry blood through the body. Your heart is the pump for this system. With each heartbeat (contraction), the heart sends blood out through large blood vessels called arteries. Blood pressure is a measure of how hard the moving  blood pushes against the walls of the arteries.  High blood pressure can harm your health  In a healthy blood vessel, the blood moves smoothly through the vessel and puts normal pressure on the vessel walls.       High blood pressure occurs when blood pushes too hard against artery walls. This causes damage to the artery walls and then the formation of scar tissue as it heals. This makes the arteries stiff and weak. Plaque sticks to the scarred tissue narrowing and hardening the arteries. High blood pressure also causes your heart to work harder to get blood out to the body. High blood pressure raises your risk of heart attack, also known as acute myocardial infarction, or AMI, heart failure, and stroke. It can also lead to kidney disease, and blindness. In general, if you have high blood pressure, keeping your blood pressure below 130/80 mmHg may help prevent these problems. Your healthcare provider may prescribe medicine to help control blood pressure if lifestyle changes are not enough.  It's important to know your blood pressure numbers. Blood pressure measurements are given as 2 numbers. Systolic blood pressure is the upper number. This is the pressure when the heart contracts. Diastolic blood pressure is the lower number. This is the pressure when the heart relaxes between beats.  Blood pressure is categorized as normal, elevated, or stage 1 or stage 2 high blood pressure:    Normal blood pressure is systolic of less than 120 and diastolic of less than 80 (120/80)    Elevated blood pressure is systolic of 120 to 129 and diastolic less than 80    Stage 1 high blood pressure is systolic is 130 to 139 or diastolic between 80 to 89    Stage 2 high blood pressure is when systolic is 140 or higher or the diastolic is 90 or higher  High blood pressure is diagnosed when multiple, separate readings show blood pressure above 130/80 mmHg. Talk with your healthcare provider if you have questions or concerns about your  "blood pressure readings.  Measuring blood pressure  An example of a blood pressure measurement is 120/70 (120 over 70). The top number is the pressure of blood against the artery walls during a heartbeat (systolic). The bottom number is the pressure of blood against artery walls between heartbeats (diastolic). Talk with your healthcare provider to find out what your blood pressure goals should be.   Controlling blood pressure  If your blood pressure is too high, work with your doctor on a plan for lowering it. Below are steps you can take that will help lower your blood pressure.    Choose heart-healthy foods. Eating healthier meals helps you control your blood pressure. Ask your doctor about the DASH eating plan. This plan helps reduce blood pressure by limiting the amount of sodium (salt) you have in your diet. DASH also encourages eating plenty of fruits and vegetables, low-fat or non-fat dairy, whole-grains, and foods high in fiber, and low in fat. This also provides an enhanced amount of potassium which can also help lower blood pressure.    Reduce sodium. Reducing sodium in your diet reduces fluid retention. Fluid retention caused by too much salt increases blood volume and blood pressure. The American Heart Association s (AHA) \"ideal\" sodium intake recommendation is 1,500 milligrams per day.  However, since American's eat so much salt, the AHA says a positive change can occur by cutting back to even 2,400 milligrams of sodium a day.    Maintain a healthy weight. Being overweight makes you more likely to have high blood pressure. Losing excess weight helps lower blood pressure.    Exercise regularly. Daily exercise helps your heart and blood vessels work better and stay healthier. It can help lower your blood pressure.    Stop smoking. Smoking increases blood pressure and damages blood vessels.    Limit alcohol. Drinking too much alcohol can raise blood pressure. Men should have no more than 2 drinks a day. " Women should have no more than 1. (A drink is equal to 1 beer, or a small glass of wine, or a shot of liquor.)    Control stress. Stress makes your heart work harder and beat faster. Controlling stress helps you control your blood pressure.  Facts about high blood pressure    Feeling OK does not mean that blood pressure is under control. Likewise, feeling bad doesn t mean it s out of control. The only way to know for sure is to check your pressure regularly.    Medicine is only one part of controlling high blood pressure. You also need to manage your weight, get regular exercise, and adjust your eating habits.    High blood pressure is usually a lifelong problem. But it can be controlled with healthy lifestyle changes and medicine.    Hypertension is not the same as stress. Although stress may be a factor in high blood pressure, it s only one part of the story.    Blood pressure medicines need to be taken every day. Stopping suddenly may cause a dangerous increase in pressure.  Date Last Reviewed: 4/27/2016 2000-2019 [employer]. [address] [place] [address]. All rights reserved. This information is not intended as a substitute for professional medical care. Always follow your healthcare professional's instructions.           Patient Education     Controlling High Blood Pressure  High blood pressure (hypertension) is often called the silent killer. This is because many people who have it, don t know it. High blood pressure can raise your risk of heart attack, stroke, heart disease, and heart failure. Controlling your blood pressure can decrease your risk of these problems. Know your blood pressure and remember to check it regularly. Doing so can save your life.  Blood pressure measurements are given as 2 numbers. Systolic blood pressure is the upper number. This is the pressure when the heart contracts. Diastolic blood pressure is the lower number. This is the pressure when the heart  relaxes between beats.  Blood pressure is categorized as normal, elevated, or stage 1 or stage 2 high blood pressure:    Normal blood pressure is systolic of less than 120 and diastolic of less than 80 (120/80)    Elevated blood pressure is systolic of 120 to 129 and diastolic less than 80    Stage 1 high blood pressure is systolic is 130 to 139 or diastolic between 80 to 89    Stage 2 high blood pressure is when systolic is 140 or higher or the diastolic is 90 or higher  Here are some things you can do to help control your blood pressure.    Choose heart-healthy foods    Select low-salt, low-fat foods. Limit sodium intake to 2,400 mg per day or the amount suggested by your healthcare provider.    Limit canned, dried, cured, packaged, and fast foods. These can contain a lot of salt.    Eat 8 to 10 servings of fruits and vegetables every day.    Choose lean meats, fish, or chicken.    Eat whole-grain pasta, brown rice, and beans.    Eat 2 to 3 servings of low-fat or fat-free dairy products.    Ask your doctor about the DASH eating plan. This plan helps reduce blood pressure.    When you go to a restaurant, ask that your meal be prepared with no added salt.    Maintain a healthy weight    Ask your healthcare provider how many calories to eat a day. Then stick to that number.    Ask your healthcare provider what weight range is healthiest for you. If you are overweight, a weight loss of only 3% to 5% of your body weight can help lower blood pressure. Generally, a good weight loss goal is to lose 10% of your body weight in a year.    Limit snacks and sweets.    Get regular exercise.    Get up and get active    Choose activities you enjoy. Find ones you can do with friends or family. This includes bicycling, dancing, walking, and jogging.    Park farther away from building entrances.    Use stairs instead of the elevator.    When you can, walk or bike instead of driving.    Tranquillity leaves, garden, or do household  repairs.    Be active at a moderate to vigorous level of physical activity for at least 40 minutes for a minimum of 3 to 4 days a week.     Manage stress    Make time to relax and enjoy life. Find time to laugh.    Communicate your concerns with your loved ones and your healthcare provider.    Visit with family and friends, and keep up with hobbies.    Limit alcohol and quit smoking    Men should have no more than 2 drinks per day.    Women should have no more than 1 drink per day.    Talk with your healthcare provider about quitting smoking. Smoking significantly increases your risk for heart disease and stroke. Ask your healthcare provider about community smoking cessation programs and other options.    Medicines  If lifestyle changes aren t enough, your healthcare provider may prescribe high blood pressure medicine. Take all medicines as prescribed. If you have any questions about your medicines, ask your healthcare provider before stopping or changing them.  Date Last Reviewed: 2016-2019 [employer]. [address] [place] [address]. All rights reserved. This information is not intended as a substitute for professional medical care. Always follow your healthcare professional's instructions.         20   [facility] [employer] [facility]   [phone]  Fax Orders to [phone]    [address] [address]    Appointment:  TODAY   Arrival Time:  3:15pm    Please bring a copy of your insurance card and photo ID    If you cannot make this appointment please call [phone] to reschedule    2020 Order faxed to [facility] Scheduling at [phone]. Karissa Oakes

## 2020-03-18 NOTE — PROGRESS NOTES
There are no exam notes on file for this visit.  Chief Complaint   Patient presents with     Musculoskeletal Problem     pain and swelling from the knee down, the right knee has been painful, on his left leg where the stumb is that the sock he puts on it is a little tight so it might be a little swollen as well      Wart     wants his hand wart frozen today as well        Subjective: The patient comes in today with 2 concerns.    His primary concern is that he has had swelling of his right lower leg.  He states that this is been present for about 2 to 3 weeks.  He does not notice any precipitating factors.  He does not really notice any improvement in the swelling day-to-day basis, but he does notice that it has overall improved slightly over the past week or so.    The patient also believes he might have some swelling of his left lower extremity.  The patient currently is wearing a 1 ply sock, and his stump is fitting tightly.  He notes that sometimes he is used a 3 or 4 ply stock to get the stump to fit.  He has not had any pain or any has no history of edema in this leg.    The patient reviewed his recent discussion with the pain clinic.  He tells me that the pain clinic doctor told him that with surgery things could get better, worse, or remain the same.  He elected not to have the surgery because he does not want any surgery if he cannot have any guarantees.    The patient and I discussed his weight.  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  He notes that is unable to be as physically active as he used to be.  He has not modified his diet at all.      Objective:    Blood pressure (!) 178/109, pulse 77, temperature 97.5  F (36.4  C), temperature source Oral, resp. rate 24, weight 113.8 kg (250 lb 12.8 oz), SpO2 97 %.  Body mass index is 33.09 kg/m .    General:  Well nourished, and in no acute distress.  The vital signs are reviewed  Heart:  RRR.  There are no murmurs, rubs, or  "gallups,   Lungs:  CTAB, no wheezes/rales/rhonchi, good air movement  Extremities: His right leg does show pitting edema up to the knee.  I do not appreciate edema in the left leg.  Semination of his left hand shows does show a callus over the hyperthenar eminence.  We again discussed that this is likely from using his crutches.  However, on his left palm radial to the callus there does appear to be a common viral wart.  This is frozen, thawed,  and frozen again with liquid nitrogen  Psych: Euthymic     Assessment and plan:        Edema, unspecified type  -     Basic Metabolic Panel ([facility])  - Results < 1 hr  -     BNP ([facility])  -     US Lower Ext Venous Duplex Limited Bilat; Future    The patient and I discussed etiologies for lower extremity edema.  I would like to check a BMP to ensure that his renal function is static.  This is especially true in view of his previously mildly elevated creatinine on the last BMP.    His lungs are clear, so I think congestive heart failure is less likely.  Nonetheless, I would like to check a BNP to ensure that this is not an etiology for his edema.    Lastly, I would like to check a ultrasound to ensure that he does not have a DVT.    Plantar wart    The patient and I discussed more aggressive therapy for this, but the patient would prefer just to have me freeze it as I have done today.    Essential hypertension    The patient and I discussed the fact that his blood pressure is under very poor control.  He has 1 normal blood pressure out of all the other outpatient blood pressures we have obtained, and the rest are all elevated.  The patient and I yet again discussed risks for CHF, MI, cerebrovascular accidents, and renal failure secondary to poorly treated hypertension.  He voices understanding, and tells me that he \"just cannot take pills\".    Benign prostatic hyperplasia with lower urinary tract symptoms, symptom details unspecified    At the time of the last visit, the " patient was prescribed tamsulosin for symptoms consistent with BPH.  The patient tells me that he did fill the prescription, but he did not take any of the pills.  He does not believe that he needs to take the pills at this point.  He is less bothered by his nocturia and urinary frequency than he was at the time of the previous visit.     A total of more than 30 minutes was spent in face to face contact with patient with > 50% in counseling and coordination of care.  We discussed the above items        Patient Instructions       Patient Education   Coping with Edema  What is edema?  Edema is the build-up of fluid in the body, which causes swelling. Swelling most commonly occurs in the feet, ankles, lower legs or hands.  Swelling can occur in the belly or chest may be a sign of a more severe problem.  Certain medicines or conditions can make the swelling worse.  Symptoms include:    Feet and lower legs get larger when you sit or walk.    Hands feel tight when you make a fist.    When you push on the skin, skin stays dented.    Shiny, tight skin.    Fast weight gain.  How is it treated?  Your care team may give you a medicine to reduce the swelling.  They may also suggest that you meet with a dietitian. He or she can help with food choices to reduce the swelling.  What can I do about the swelling?    Place your feet above your heart 3 times a day: Sit with your feet up on a stool with a pillow. Sit on the bed or couch with two pillows under your feet.    Do not stand for long periods of time.    Wear loose-fitting clothes.    Do not cross your legs.    Reduce the salt in your diet.   These foods are high in salt:  ? Chips, soup  ? Frozen meals, TV dinners  ? Payan, lunch meat, ham  ? Sauces (soy, canned spaghetti sauce)    Walk or do other exercise.    Wear compression stockings.    Drink water as normal.    Weigh yourself every day at the same time to keep track of weight gain.  When should I call my care team?  Call  your care team if:    You have a hard time breathing.    You gained 5 pounds or more in 1 week.    Your hands or feet feel cold when you touch them.    You are peeing very little or not at all.    Swelling is moving up your arms or legs.    Your tongue is swelling.    You cannot eat for more than a day  If you have any side effects, call us. We can help you manage these problems.  For more information, see:  www.[facility].com  www.cancer.org/treatment/treatmentsandsideeffects/physicalsideeffects/dealingwithsymptomsathome  https://www.cancer.gov/publications/patient-education/chemo-and-you  Comments:  __________________________________________  __________________________________________  __________________________________________  __________________________________________  __________________________________________  __________________________________________  __________________________________________  For informational purposes only. Not to replace the advice of your health care provider. Copyright   2014 [employer]. All rights reserved. Clinically reviewed by Celena Jordan, Oncology Department. [employer] [id number] - REV 08/19.        Patient Education     Discharge Instructions for High Blood Pressure (Hypertension)  You have been diagnosed with high blood pressure (also called hypertension). This means the force of blood against your artery walls is too strong. It also means your heart is working hard to move blood. High blood pressure usually has no symptoms, but over time, it can cause serious health problems. High blood pressure raises your risk for heart attack, stroke, heart failure, kidney disease, and blindness. With help from your doctor, you can manage your blood pressure and protect your health.  Blood pressure measurements are given as 2 numbers. Systolic blood pressure is the upper number. This is the pressure when the heart contracts. Diastolic blood pressure is the lower number.  "This is the pressure when the heart relaxes between beats.  Blood pressure is categorized as normal, elevated, or stage 1 or stage 2 high blood pressure:    Normal blood pressure is systolic of less than 120 and diastolic of less than 80 (120/80)    Elevated blood pressure is systolic of 120 to 129 and diastolic less than 80    Stage 1 high blood pressure is systolic is 130 to 139 or diastolic between 80 to 89    Stage 2 high blood pressure is when systolic is 140 or higher or the diastolic is 90 or higher  Taking medicine    Learn to take your own blood pressure. Keep a record of your results. Ask your doctor which readings mean that you need medical attention.    Take your blood pressure medicine exactly as directed. Don t skip doses. Missing doses can cause your blood pressure to get out of control.    If you do miss a dose (or doses) check with your healthcare provider about what to do.    Don't take medicines that contain heart stimulants, including over-the-counter medicines. Check for warnings about high blood pressure on the label. Ask the pharmacist before purchasing something you haven't used before    Check with your doctor or pharmacist before taking a decongestant. Some decongestants can worsen high blood pressure.  Lifestyle changes    Maintain a healthy weight. Get help to lose any extra pounds.    Cut back on salt.  ? Limit canned, dried, packaged, and fast foods.  ? Don t add salt to your food at the table.  ? Season foods with herbs instead of salt when you cook.  ? Request no added salt when you go to a restaurant.  ? The American Heart Association (AHA) says the \"ideal\" amount of sodium is no more than 1,500 mg a day.  But because Americans eat so much salt, you can make a positive change by cutting back to even 2,400 mg of sodium a day.     Follow the DASH (Dietary Approaches to Stop Hypertension) eating plan. This plan recommends vegetables, fruits, whole gains, and other heart healthy " foods.    Begin an exercise program. Ask your healthcare provider how to get started. The AHA recommends aerobic exercise 3 to 4 times a week for an average of 40 minutes at a time, with your provider's approval. Simple activities like walking or gardening can help.    Break the smoking habit. Enroll in a stop-smoking program to improve your chances of success. Ask your healthcare provider about programs and medicines to help you stop smoking.    Limit drinks that contain caffeine such as coffee, black or green tea, and cola to 2 per day.    Never take stimulants such as amphetamines or cocaine. These drugs can be deadly for someone with high blood pressure.    Control your stress. Learn ways to manage stress.    Limit alcohol to no more than 1 drink a day for women and 2 drinks a day for men.  Follow-up care  Make a follow-up appointment as directed.  When to call your healthcare provider  Call your healthcare provider right away if you have any of the following:    Chest pain or shortness of breath (call 911)    Moderate to severe headache    Weakness in the muscles of your face, arms, or legs    Trouble speaking    Extreme drowsiness    Confusion    Fainting or dizziness    Pulsating or rushing sound in your ears    Unexplained nosebleed    Weakness, tingling, or numbness of your face, arms, or legs    Change in vision    Blood pressure measured at home that is greater than 180/110   Date Last Reviewed: 4/27/2016 2000-2019 The "[employer]. [address] [place] [address]. All rights reserved. This information is not intended as a substitute for professional medical care. Always follow your healthcare professional's instructions.           Patient Education     What is High Blood Pressure?  High blood pressure (also called hypertension) is known as the  silent killer.  This is because most of the time it doesn t cause symptoms. In fact, many people don t know they have it until other  problems develop. In most cases, high blood pressure often requires lifelong treatment.  Understanding blood pressure  The circulatory system is made up of the heart and blood vessels that carry blood through the body. Your heart is the pump for this system. With each heartbeat (contraction), the heart sends blood out through large blood vessels called arteries. Blood pressure is a measure of how hard the moving blood pushes against the walls of the arteries.  High blood pressure can harm your health  In a healthy blood vessel, the blood moves smoothly through the vessel and puts normal pressure on the vessel walls.       High blood pressure occurs when blood pushes too hard against artery walls. This causes damage to the artery walls and then the formation of scar tissue as it heals. This makes the arteries stiff and weak. Plaque sticks to the scarred tissue narrowing and hardening the arteries. High blood pressure also causes your heart to work harder to get blood out to the body. High blood pressure raises your risk of heart attack, also known as acute myocardial infarction, or AMI, heart failure, and stroke. It can also lead to kidney disease, and blindness. In general, if you have high blood pressure, keeping your blood pressure below 130/80 mmHg may help prevent these problems. Your healthcare provider may prescribe medicine to help control blood pressure if lifestyle changes are not enough.  It's important to know your blood pressure numbers. Blood pressure measurements are given as 2 numbers. Systolic blood pressure is the upper number. This is the pressure when the heart contracts. Diastolic blood pressure is the lower number. This is the pressure when the heart relaxes between beats.  Blood pressure is categorized as normal, elevated, or stage 1 or stage 2 high blood pressure:    Normal blood pressure is systolic of less than 120 and diastolic of less than 80 (120/80)    Elevated blood pressure is systolic  "of 120 to 129 and diastolic less than 80    Stage 1 high blood pressure is systolic is 130 to 139 or diastolic between 80 to 89    Stage 2 high blood pressure is when systolic is 140 or higher or the diastolic is 90 or higher  High blood pressure is diagnosed when multiple, separate readings show blood pressure above 130/80 mmHg. Talk with your healthcare provider if you have questions or concerns about your blood pressure readings.  Measuring blood pressure  An example of a blood pressure measurement is 120/70 (120 over 70). The top number is the pressure of blood against the artery walls during a heartbeat (systolic). The bottom number is the pressure of blood against artery walls between heartbeats (diastolic). Talk with your healthcare provider to find out what your blood pressure goals should be.   Controlling blood pressure  If your blood pressure is too high, work with your doctor on a plan for lowering it. Below are steps you can take that will help lower your blood pressure.    Choose heart-healthy foods. Eating healthier meals helps you control your blood pressure. Ask your doctor about the DASH eating plan. This plan helps reduce blood pressure by limiting the amount of sodium (salt) you have in your diet. DASH also encourages eating plenty of fruits and vegetables, low-fat or non-fat dairy, whole-grains, and foods high in fiber, and low in fat. This also provides an enhanced amount of potassium which can also help lower blood pressure.    Reduce sodium. Reducing sodium in your diet reduces fluid retention. Fluid retention caused by too much salt increases blood volume and blood pressure. The American Heart Association s (AHA) \"ideal\" sodium intake recommendation is 1,500 milligrams per day.  However, since American's eat so much salt, the AHA says a positive change can occur by cutting back to even 2,400 milligrams of sodium a day.    Maintain a healthy weight. Being overweight makes you more likely to " have high blood pressure. Losing excess weight helps lower blood pressure.    Exercise regularly. Daily exercise helps your heart and blood vessels work better and stay healthier. It can help lower your blood pressure.    Stop smoking. Smoking increases blood pressure and damages blood vessels.    Limit alcohol. Drinking too much alcohol can raise blood pressure. Men should have no more than 2 drinks a day. Women should have no more than 1. (A drink is equal to 1 beer, or a small glass of wine, or a shot of liquor.)    Control stress. Stress makes your heart work harder and beat faster. Controlling stress helps you control your blood pressure.  Facts about high blood pressure    Feeling OK does not mean that blood pressure is under control. Likewise, feeling bad doesn t mean it s out of control. The only way to know for sure is to check your pressure regularly.    Medicine is only one part of controlling high blood pressure. You also need to manage your weight, get regular exercise, and adjust your eating habits.    High blood pressure is usually a lifelong problem. But it can be controlled with healthy lifestyle changes and medicine.    Hypertension is not the same as stress. Although stress may be a factor in high blood pressure, it s only one part of the story.    Blood pressure medicines need to be taken every day. Stopping suddenly may cause a dangerous increase in pressure.  Date Last Reviewed: 4/27/2016 2000-2019 The [employer]. [address] [place] [address], [place] [address]. All rights reserved. This information is not intended as a substitute for professional medical care. Always follow your healthcare professional's instructions.           Patient Education     Controlling High Blood Pressure  High blood pressure (hypertension) is often called the silent killer. This is because many people who have it, don t know it. High blood pressure can raise your risk of heart attack, stroke, heart disease,  and heart failure. Controlling your blood pressure can decrease your risk of these problems. Know your blood pressure and remember to check it regularly. Doing so can save your life.  Blood pressure measurements are given as 2 numbers. Systolic blood pressure is the upper number. This is the pressure when the heart contracts. Diastolic blood pressure is the lower number. This is the pressure when the heart relaxes between beats.  Blood pressure is categorized as normal, elevated, or stage 1 or stage 2 high blood pressure:    Normal blood pressure is systolic of less than 120 and diastolic of less than 80 (120/80)    Elevated blood pressure is systolic of 120 to 129 and diastolic less than 80    Stage 1 high blood pressure is systolic is 130 to 139 or diastolic between 80 to 89    Stage 2 high blood pressure is when systolic is 140 or higher or the diastolic is 90 or higher  Here are some things you can do to help control your blood pressure.    Choose heart-healthy foods    Select low-salt, low-fat foods. Limit sodium intake to 2,400 mg per day or the amount suggested by your healthcare provider.    Limit canned, dried, cured, packaged, and fast foods. These can contain a lot of salt.    Eat 8 to 10 servings of fruits and vegetables every day.    Choose lean meats, fish, or chicken.    Eat whole-grain pasta, brown rice, and beans.    Eat 2 to 3 servings of low-fat or fat-free dairy products.    Ask your doctor about the DASH eating plan. This plan helps reduce blood pressure.    When you go to a restaurant, ask that your meal be prepared with no added salt.    Maintain a healthy weight    Ask your healthcare provider how many calories to eat a day. Then stick to that number.    Ask your healthcare provider what weight range is healthiest for you. If you are overweight, a weight loss of only 3% to 5% of your body weight can help lower blood pressure. Generally, a good weight loss goal is to lose 10% of your body  weight in a year.    Limit snacks and sweets.    Get regular exercise.    Get up and get active    Choose activities you enjoy. Find ones you can do with friends or family. This includes bicycling, dancing, walking, and jogging.    Park farther away from building entrances.    Use stairs instead of the elevator.    When you can, walk or bike instead of driving.    [place] leaves, garden, or do household repairs.    Be active at a moderate to vigorous level of physical activity for at least 40 minutes for a minimum of 3 to 4 days a week.     Manage stress    Make time to relax and enjoy life. Find time to laugh.    Communicate your concerns with your loved ones and your healthcare provider.    Visit with family and friends, and keep up with hobbies.    Limit alcohol and quit smoking    Men should have no more than 2 drinks per day.    Women should have no more than 1 drink per day.    Talk with your healthcare provider about quitting smoking. Smoking significantly increases your risk for heart disease and stroke. Ask your healthcare provider about community smoking cessation programs and other options.    Medicines  If lifestyle changes aren t enough, your healthcare provider may prescribe high blood pressure medicine. Take all medicines as prescribed. If you have any questions about your medicines, ask your healthcare provider before stopping or changing them.  Date Last Reviewed: 2016-2019 The [employer]. [address] [place] [address], [place] [address]. All rights reserved. This information is not intended as a substitute for professional medical care. Always follow your healthcare professional's instructions.         20   [facility] [place]   [phone]  Fax Orders to [phone]    [address] [address]    Appointment:  TODAY   Arrival Time:  3:15pm    Please bring a copy of your insurance card and photo  ID    If you cannot make this appointment please call [phone] to reschedule    March 18, 2020 [facility] [phone]. Karissa Oakes        The patient was actively involved in the decision making process, and all the questions were answered to their satisfaction prior to leaving.

## 2020-03-18 NOTE — LETTER
March 19, 2020      Jesus Presley  [address] [place] [address]        Dear ,    We are writing to inform you of your test results.  Your kidney and electrolyte tests are normal       Resulted Orders   Basic Metabolic Panel ([facility])  - Results < 1 hr   Result Value Ref Range    Urea Nitrogen 17.6 7.0 - 21.0 mg/dL    Calcium 10.0 8.5 - 10.1 mg/dL    Chloride 102.9 98.0 - 110.0 mmol/L    Carbon Dioxide 23.3 20.0 - 32.0 mmol/L    Creatinine 1.0 0.7 - 1.3 mg/dL    Glucose 106.3 (H) 70.0 - 99.0 mg'dL    Potassium 4.2 3.2 - 4.6 mmol/L    Sodium 137.9 132.0 - 142.0 mmol/L    GFR Estimate 79.0 >60.0 mL/min/1.7 m2    GFR Estimate If Black >90 >60.0 mL/min/1.7 m2       If you have any questions or concerns, please call the clinic at the number listed above.       Sincerely,        Cristi Antony MD

## 2020-03-19 DIAGNOSIS — R60.9 EDEMA, UNSPECIFIED TYPE: ICD-10-CM

## 2020-03-20 ENCOUNTER — TELEPHONE (OUTPATIENT)
Dept: FAMILY MEDICINE | Facility: CLINIC | Age: 60
End: 2020-03-20

## 2020-03-20 DIAGNOSIS — R60.9 EDEMA, UNSPECIFIED TYPE: Primary | ICD-10-CM

## 2020-03-20 RX ORDER — FUROSEMIDE 20 MG
20 TABLET ORAL DAILY
Qty: 10 TABLET | Refills: 0 | Status: SHIPPED | OUTPATIENT
Start: 2020-03-20 | End: 2021-04-18

## 2020-03-20 NOTE — TELEPHONE ENCOUNTER
[facility] phone call message- patient requesting results:    Test: Lab    Date of test: 03/18/2020    Additional Comments: Lab work was done here and and U/S was done at [facility] the same day.    OK to leave a message on voice mail? Yes    Primary language: English      needed? No    Call taken on March 20, 2020 at 1:47 PM by Basilio Shankar

## 2020-03-20 NOTE — TELEPHONE ENCOUNTER
Read result notes from labs and ultra sound from Dr. Antony. Patient will go to the pharmacy today to  the furosemide.     Requested patient call us after completing course of furosemide to discuss follow up plan. Instructed patient to call back if swelling increases.      Routed to Dr. Morris, Dr. Antony. ./MAGGIE

## 2020-03-30 ENCOUNTER — TELEPHONE (OUTPATIENT)
Dept: FAMILY MEDICINE | Facility: CLINIC | Age: 60
End: 2020-03-30

## 2020-03-30 NOTE — TELEPHONE ENCOUNTER
"Patient reports he completed his 10 day course of Lasix (today was the last day) and has not had any change in swelling. He did not notice an \"excessive\" increase in urination, but not some increase.     He reports there is some discomfort with the swelling. His foot is swollen too (he has one BKA prosthetic). His stump is swollen as well to the point he had to increase the size of his prosthetic. He otherwise feels well, no swelling elsewhere or shortness or breath.     He notes this has been going on for weeks. He had a negative BLE US and recent BNP and BMP were normal. He is wondering where to go from here. He has previously seen Dr. Antony, and would like to have his next appointment with Dr. Metzger as he heard he is a good doctor.     Routed to Dr. Antony, Dr. Metzger. ./LR    "

## 2020-03-30 NOTE — TELEPHONE ENCOUNTER
[facility] phone call message- general phone call:    Reason for call: Pt is stating he still has swelling below his knees, and its been for 7 weeks.     Return call needed: Yes    OK to leave a message on voice mail? Yes    Primary language: English      needed? No    Call taken on March 30, 2020 at 10:52 AM by Grisel Flores-Cardona

## 2020-04-02 ENCOUNTER — VIRTUAL VISIT (OUTPATIENT)
Dept: FAMILY MEDICINE | Facility: CLINIC | Age: 60
End: 2020-04-02
Payer: COMMERCIAL

## 2020-04-02 DIAGNOSIS — R60.9 EDEMA, UNSPECIFIED TYPE: Primary | ICD-10-CM

## 2020-04-02 DIAGNOSIS — I10 ESSENTIAL HYPERTENSION: ICD-10-CM

## 2020-04-02 DIAGNOSIS — F17.200 SMOKER: ICD-10-CM

## 2020-04-02 DIAGNOSIS — Z13.9 SCREENING FOR CONDITION: ICD-10-CM

## 2020-04-02 DIAGNOSIS — T87.30: ICD-10-CM

## 2020-04-02 NOTE — PATIENT INSTRUCTIONS
"  1) Check and record blood pressure once a day. Bring record to clinic visit Thursday April 16th    2) Limit salt in diet:  Limit \"cured\" meats like ham, baloney. Limit soup from can. Ramen seasoning, Mac and cheese, salt shaker    3) Limit Ibuprofen.  Tylenol OK    4) Continue to follow with the pain doctor    5) Limit smoking  "
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
## 2020-04-03 ENCOUNTER — TELEPHONE (OUTPATIENT)
Dept: FAMILY MEDICINE | Facility: CLINIC | Age: 60
End: 2020-04-03

## 2020-04-03 ENCOUNTER — OFFICE VISIT (OUTPATIENT)
Dept: FAMILY MEDICINE | Facility: CLINIC | Age: 60
End: 2020-04-03
Payer: COMMERCIAL

## 2020-04-03 VITALS
WEIGHT: 246 LBS | HEART RATE: 98 BPM | DIASTOLIC BLOOD PRESSURE: 88 MMHG | BODY MASS INDEX: 32.46 KG/M2 | OXYGEN SATURATION: 96 % | TEMPERATURE: 97.8 F | SYSTOLIC BLOOD PRESSURE: 142 MMHG | RESPIRATION RATE: 22 BRPM

## 2020-04-03 DIAGNOSIS — I10 ESSENTIAL HYPERTENSION: ICD-10-CM

## 2020-04-03 DIAGNOSIS — R60.9 EDEMA, UNSPECIFIED TYPE: Primary | ICD-10-CM

## 2020-04-03 DIAGNOSIS — R60.9 EDEMA, UNSPECIFIED TYPE: ICD-10-CM

## 2020-04-03 DIAGNOSIS — Z13.9 SCREENING FOR CONDITION: ICD-10-CM

## 2020-04-03 LAB
ALBUMIN SERPL-MCNC: 4.9 MG/DL (ref 3.3–4.9)
ALP SERPL-CCNC: 100.9 U/L (ref 40–150)
ALT SERPL-CCNC: 37.6 U/L (ref 0–45)
AST SERPL-CCNC: 28 U/L (ref 0–55)
BACTERIA: NORMAL
BILIRUB SERPL-MCNC: 0.5 MG/DL (ref 0.2–1.3)
BUN SERPL-MCNC: 19.9 MG/DL (ref 7–21)
CALCIUM SERPL-MCNC: 10.3 MG/DL (ref 8.5–10.1)
CASTS: NORMAL /LPF
CHLORIDE SERPLBLD-SCNC: 105.9 MMOL/L (ref 98–110)
CHOLEST SERPL-MCNC: 203.1 MG/DL (ref 0–200)
CHOLEST/HDLC SERPL: 3.7 {RATIO} (ref 0–5)
CO2 SERPL-SCNC: 25.7 MMOL/L (ref 20–32)
CREAT SERPL-MCNC: 1.4 MG/DL (ref 0.7–1.3)
CREAT UR-MCNC: 274.9 MG/DL
CRYSTAL URINE: NORMAL /LPF
EPITHELIAL CELLS UR: NORMAL /LPF (ref 0–2)
GFR SERPL CREATININE-BSD FRML MDRD: 54.6 ML/MIN/1.7 M2
GLUCOSE SERPL-MCNC: 99.8 MG'DL (ref 70–99)
HDLC SERPL-MCNC: 54.6 MG/DL
HIV 1+2 AB+HIV1 P24 AG SERPL QL IA: NEGATIVE
LDLC SERPL CALC-MCNC: 105 MG/DL (ref 0–129)
MUCOUS URINE: NORMAL LPF
POTASSIUM SERPL-SCNC: 4 MMOL/L (ref 3.2–4.6)
PROT SERPL-MCNC: 8.1 G/DL (ref 6.8–8.8)
PROT UR-MCNC: 52 MG/DL
PROTEIN/CREAT RATIO, RANDOM UR: 0.19
RBC URINE: NORMAL /HPF
SODIUM SERPL-SCNC: 140.9 MMOL/L (ref 132–142)
TRIGL SERPL-MCNC: 219.9 MG/DL (ref 0–150)
VLDL CHOLESTEROL: 44 MG/DL (ref 7–32)
WBC URINE: NORMAL /HPF

## 2020-04-03 RX ORDER — HYDROCHLOROTHIAZIDE 25 MG/1
25 TABLET ORAL DAILY
Qty: 90 TABLET | Refills: 3 | Status: SHIPPED | OUTPATIENT
Start: 2020-04-03 | End: 2021-07-01

## 2020-04-03 NOTE — TELEPHONE ENCOUNTER
"Vanda Blanco may have already called Mr Presley to let him know:  1) Dr. Metzger feels that the \"Comprilan\" wrap works best.  10cm by 5meter roll cost $12.03 off [employer]; he buys them for family members off [employer].    it has \"short stretch\" fibers. [facility] does not have this in the Clinic.    Since I spoke to Bal, I just thought of this:  If he chooses not to buy them, [facility] may have \"compression stockings\".  Dr. Metzger sent a prescription to his [facility] for a pair. Mr. Presley should check with his [facility] to see what is out of pocket cost will be.      Thanks,  MB  "

## 2020-04-03 NOTE — PATIENT INSTRUCTIONS
"Leg swelling.  New for you to have, but fairly common for this be be evaluated. Good news; Based on initial testing, does not appear to be caused by immediate dangerous cause: Blood clot, kidney failure or heart failure.  Circulation (pulse into foot) OK today.    Most other causes \"get better on their own\"  \"Venous stasis\" most likely cause. Getting the swelling down with intense program of elevation, compression and limited salt intake over 3-4 weeks; then keep this from coming back with occasional use of elevation/compression and monitoring salt intake.    Blood and urine tests checked for more rare (but serious) conditions.      1) Check and record blood pressure once a day. We will review these at telephone visit Thursday April 16th.  Goal is to have \"Top number\" 150 or less;  \"bottom number\" 90 or less.     2) Limit salt in diet:  Limit \"cured\" meats like ham, baloney. Limit soup from can. Ramen seasoning, Mac and cheese, salt shaker     3) Limit Ibuprofen.  Tylenol OK    4) Limit smoking.    5) Elevate leg for 10 minutes of every hour while awake.    6) Complilan compression wraps (get from [employer])    7) hydrochlorothiazide. Take daily until swelling down; then daily or prn for BP and/ or swelling  "

## 2020-04-03 NOTE — PROGRESS NOTES
"       SUBJECTIVE       Jesus Presley is a 59 year old  male with a PMH significant for:     Patient Active Problem List   Diagnosis     Traumatic amputation of lower extremity (H)     Essential hypertension     Smoker     Benign prostatic hyperplasia with lower urinary tract symptoms, symptom details unspecified     Amputation stump complicated by neuroma (H)     Leg swelling.  New for you to have, but fairly common for this be be evaluated. Good news; Based on initial testing, does not appear to be caused by immediate dangerous cause: Blood clot, kidney failure or heart failure.  Circulation (pulse into foot) OK today.    Most other causes \"get better on their own\"  \"Venous stasis\" most likely cause. Getting the swelling down with intense program of elevation, compression and limited salt intake over 3-4 weeks; then keep this from coming back with occasional use of elevation/compression and monitoring salt intake.  PMH, Medications and Allergies were reviewed and updated as needed.        REVIEW OF SYSTEMS     General: No fevers, chills, sweats, unexplained weight loss  Head: No headache  Neck: No swallowing problems   CV: No chest pain or palpitations  Resp: No shortness of breath.  No cough. No hemoptysis.  GI: No constipation, diarrhea, or blood in stool.  no nausea or vomiting  : No pain passing urine or urinary frequency            OBJECTIVE     Vitals:    04/03/20 0904 04/03/20 0909   BP: (!) 150/92 (!) 142/88   BP Location: Left arm    Patient Position: Sitting    Cuff Size: Adult Large    Pulse: 98    Resp: 22    Temp: 97.8  F (36.6  C)    TempSrc: Oral    SpO2: 96%    Weight: 111.6 kg (246 lb)      Body mass index is 32.46 kg/m .    Gen:  Well nourished and in NAD  HEENT: PERRLA; TMs normal color and landmarks; nasopharynx pink and moist; oropharynx pink and moist  Neck: supple without lymphadenopathy  CV:  Perfusion adequate. No murmur or S3 heard,   Pulm:  Rare basilar rales; cleared with cough. f " "air entry   ABD: soft, nontender,   Extrem: RIGHT shin 3+ pitting edema. Decreased hair. 1+ DP pulse  Psych: concrete    Results for orders placed or performed in visit on 04/03/20 (from the past 24 hour(s))   Urine Microscopic ([facility])   Result Value Ref Range    WBC Urine 2-5 <5 /hpf    RBC Urine None <5 /hpf    Epithelial Cells UR None 0 - 2 /lpf    Mucous Urine None NONE lpf    Casts Urine None NONE /lpf    Crystal Urine calcium oxalate NONE /lpf    Bacteria Wet Prep Few None           ASSESSMENT AND PLAN     Jesus was seen today for leg swelling.    Diagnoses and all orders for this visit:    Edema, unspecified type  -     Comprehensive Metabolic Panel ([facility])  -     Urine Microscopic ([facility])  -     Prot/Creat Random Urine ([facility])  -     Streptococcal Lakshmi ([facility])  -     hydrochlorothiazide (HYDRODIURIL) 25 MG tablet; Take 1 tablet (25 mg) by mouth daily ; or as directed for leg swelling and to lower blood pressure  -     order for DME; Comprilan 8cm by 5 meter \"short stretch\" roll    Screening for condition  -     Hepatitis C Antibody ([facility])  -     HIV Ag/Ab Screen [place] ([facility])  -     Lipid Panel ([facility])    Essential hypertension  -     Streptococcal Lakshmi ([facility])  -     hydrochlorothiazide (HYDRODIURIL) 25 MG tablet; Take 1 tablet (25 mg) by mouth daily ; or as directed for leg swelling and to lower blood pressure        Patient Instructions   Leg swelling.  New for you to have, but fairly common for this be be evaluated. Good news; Based on initial testing, does not appear to be caused by immediate dangerous cause: Blood clot, kidney failure or heart failure.  Circulation (pulse into foot) OK today.    Most other causes \"get better on their own\"  \"Venous stasis\" most likely cause. Getting the swelling down with intense program of elevation, compression and limited salt intake over 3-4 weeks; then keep this from coming back with occasional use of elevation/compression and " "monitoring salt intake.    Blood and urine tests checked for more rare (but serious) conditions.      1) Check and record blood pressure once a day. We will review these at telephone visit Thursday April 16th.  Goal is to have \"Top number\" 150 or less;  \"bottom number\" 90 or less.     2) Limit salt in diet:  Limit \"cured\" meats like ham, baloney. Limit soup from can. Ramen seasoning, Mac and cheese, salt shaker     3) Limit Ibuprofen.  Tylenol OK    4) Limit smoking.    5) Elevate leg for 10 minutes of every hour while awake.    6) Complilan compression wraps (get from [employer])    7) hydrochlorothiazide. Take daily until swelling down; then daily or prn for BP and/ or swelling      Total of 30 minutes was spent in face to face contact with patient with > 50% in counseling and coordination of care.  Options for treatment and/or follow-up care were reviewed with the patient. Jesus Presley was engaged and actively involved in the decision making process. He verbalized understanding of the options discussed and was satisfied with the final plan.    Telephone visit in 2 weeks for     Mata Metzger MD         "

## 2020-04-03 NOTE — TELEPHONE ENCOUNTER
"[facility] phone call message- general phone call:    Reason for call: [facility] did receive the fax but now informed the patient that his insurance does not cover the Comprilan 8cm by 5 meter \"short stretch\" roll.  They want $14.00 and some odd cents a roll and $10.00 for delivery.  He needs something else prescribed for him. Can someone call him before the day is out.       Return call needed: Yes    OK to leave a message on voice mail? Yes    Primary language: English      needed? No    Call taken on April 3, 2020 at 3:40 PM by Basilio Shankar  "

## 2020-04-06 LAB — HCV AB SER QL: NEGATIVE

## 2020-04-07 NOTE — TELEPHONE ENCOUNTER
Faxed prescription to Simran @ [facility] (Fax # [phone]), she will check on it and will give patient a call.  Tried calling patient to let him know about other option if insurance not cover the first one, but no answer and does not have a choice for message.

## 2020-04-09 LAB — ANTISTREPTOLYSIN O: <25 IU/ML (ref 0–120)

## 2020-11-17 NOTE — PATIENT INSTRUCTIONS
Patient Education     BPH (Enlarged Prostate)  The prostate is a gland at the base of the bladder. As some men get older, the prostate may get bigger in size. This problem is called benign prostatic hyperplasia (BPH). BPH puts pressure on the urethra. This is the tube that carries urine from the bladder to the penis. It may interfere with the flow of urine. It may also keep the bladder from emptying fully.    Symptoms of BPH include trouble starting urination and feeling as though the bladder isn t emptying all the way. It also includes a weak urine stream, dribbling and leaking of urine, and frequent and urgent urination (especially at night). BPH can increase the risk of urinary infections. It can also block off urine flow completely. If this occurs, a thin tube (catheter) may be passed into the bladder to help drain urine.  If symptoms are mild, no treatment may be needed right now. If symptoms are more severe, treatment is likely needed. The goal of treatment is to improve urine flow and reduce symptoms. Treatments can include medicine and procedures. Your healthcare provider will discuss treatment options with you as needed.  Home care  The following guidelines will help you care for yourself at home:    Urinate as soon as you feel the urge. Don't try to hold your urine.    Don't limit your fluid intake during the day. Drink 6 to 8 glasses of water or liquids a day. This prevents bacteria from building up in the bladder.    Avoid drinking fluids after dinner to help reduce urination during the night.    Avoid medicines that can worsen your symptoms. These include certain cold and allergy medicines and antidepressants. Diuretics used for high blood pressure can also worsen symptoms. Talk to your doctor about the medicines you take. Other choices may work better for you.  Prostate cancer screening  BPH does not increase the risk of prostate cancer. But because prostate cancer is a common cancer in men,  Are Labs Available For Review?: No- Labs Deferred This Month Pounds Preamble Statement (Weight Entered In Details Tab): Reported Weight in pounds: screening is sometimes recommended. This may help detect the cancer in its early stages when treatment is most effective. Factors that can increase the risk of prostate cancer include being -American or having a father or brother who had prostate cancer. A high-fat diet may also increase the risk of prostate cancer. Talk to your healthcare provider to see whether you should be screened for prostate cancer.  Follow-up care  Follow up with your healthcare provider, or as advised  To learn more, go to:    National Kidney & Urologic Diseases Information Clearinghouse  kidney.niddk.nih.gov, 548.559.4078  When to seek medical advice  Call your healthcare provider right away if any of these occur:    Fever of 100.4 F (38.0 C) or higher, or as advised    Unable to pass urine for 8 hours    Increasing pressure or pain in your bladder (lower abdomen)    Blood in the urine    Increasing low back pain, not related to injury    Symptoms of urinary infection (increased urge to urinate, burning when passing urine, foul-smelling urine)  Date Last Reviewed: 7/1/2016 2000-2018 [employer]. [address] [place] [address], [place] [address]. All rights reserved. This information is not intended as a substitute for professional medical care. Always follow your healthcare professional's instructions.            Dosing Month 3 (Required For Cumulative Dosing): 40mg Daily Detail Level: Zone Male Completion Statement: After discussing his treatment course we decided to discontinue isotretinoin therapy at this time. He shouldn't donate blood for one month after the last dose. He should call with any new symptoms of depression. Ipledge Number (Optional): [id number] Display Individual Monthly Dosage In The Note (If Yes Will Display All Dosages Which Are Not N/A): yes Patient Weight (Optional But Required For Cumulative Dose-Numbers And Decimals Only): 138 Kilograms Preamble Statement (Weight Entered In Details Tab): Reported Weight in kilograms: Completed Therapy?: No Weight Units: pounds Female Completion Statement: After discussing her treatment course we decided to discontinue isotretinoin therapy at this time. I explained that she would need to continue her birth control methods for at least one month after the last dosage. She should also get a pregnancy test one month after the last dose. She shouldn't donate blood for one month after the last dose. She should call with any new symptoms of depression. Months Of Therapy Completed: 4 Upper Range (In Mg/Kg): 150 Hypertriglyceridemia Monitoring: I explained this is common when taking isotretinoin. We will monitor closely. Counseling Text: I reviewed the side effect in detail. Patient should get monthly blood tests, not donate blood, not drive at night if vision affected, and not share medication. Cheilitis Aggressive Treatment: I recommended application of Vaseline or Aquaphor numerous times a day (as often as every hour) and before going to bed. I also prescribed a topical steroid for twice daily use. Female Pregnancy Counseling Text: Female patients should also be on two forms of birth control while taking this medication and for one month after their last dose. Myalgia Monitoring: I explained this is common when taking isotretinoin. If this worsens they will contact us. Target Cumulative Dosage (In Mg/Kg): 135 What Is The Patient's Gender: Female Retinoid Dermatitis Normal Treatment: I recommended more frequent application of Cetaphil or CeraVe to the areas of dermatitis. Xerosis Normal Treatment: I recommended application of Cetaphil or CeraVe numerous times a day going to bed to all dry areas. Retinoid Dermatitis Aggressive Treatment: I recommended more frequent application of Cetaphil or CeraVe to the areas of dermatitis. I also prescribed a topical steroid for twice daily use until the dermatitis resolves. Xerosis Aggressive Treatment: I recommended application of Cetaphil or CeraVe numerous times a day going to bed to all dry areas. I also prescribed a topical steroid for twice daily use. Myalgia Treatment: I explained this is common when taking isotretinoin. If this worsens they will contact us. They may try OTC ibuprofen. Nosebleeds Normal Treatment: I explained this is common when taking isotretinoin. I recommended saline mist in each nostril multiple times a day. If this worsens they will contact us. Headache Monitoring: I recommended monitoring the headaches for now. There is no evidence of increased intracranial pressure. They were instructed to call if the headaches are worsening. Cheilitis Normal Treatment: I recommended application of Vaseline or Aquaphor numerous times a day (as often as every hour) and before going to bed. Xerosis Normal Treatment: I recommended application of Cetaphil or CeraVe numerous times a day and before going to bed to all dry areas. Xerosis Aggressive Treatment: I recommended application of Cetaphil or CeraVe numerous times a day and before going to bed to all dry areas. I also prescribed a topical steroid for twice daily use. Lower Range (In Mg/Kg): 120 Is Xerosis Present?: Yes - Normal Treatment

## 2021-02-12 ENCOUNTER — OFFICE VISIT (OUTPATIENT)
Dept: FAMILY MEDICINE | Facility: CLINIC | Age: 61
End: 2021-02-12
Payer: COMMERCIAL

## 2021-02-12 VITALS
TEMPERATURE: 98.3 F | BODY MASS INDEX: 32.72 KG/M2 | HEART RATE: 95 BPM | SYSTOLIC BLOOD PRESSURE: 158 MMHG | OXYGEN SATURATION: 97 % | RESPIRATION RATE: 16 BRPM | DIASTOLIC BLOOD PRESSURE: 83 MMHG | WEIGHT: 248 LBS

## 2021-02-12 DIAGNOSIS — R60.9 EDEMA, UNSPECIFIED TYPE: ICD-10-CM

## 2021-02-12 DIAGNOSIS — R21 RASH: Primary | ICD-10-CM

## 2021-02-12 DIAGNOSIS — M79.661 PAIN OF RIGHT LOWER LEG: ICD-10-CM

## 2021-02-12 DIAGNOSIS — F17.200 SMOKER: ICD-10-CM

## 2021-02-12 DIAGNOSIS — I10 ESSENTIAL HYPERTENSION: ICD-10-CM

## 2021-02-12 DIAGNOSIS — S88.912D TRAUMATIC AMPUTATION OF LEFT LOWER EXTREMITY, SUBSEQUENT ENCOUNTER (H): ICD-10-CM

## 2021-02-12 DIAGNOSIS — L72.3 SEBACEOUS CYST: ICD-10-CM

## 2021-02-12 DIAGNOSIS — N52.9 ERECTILE DYSFUNCTION, UNSPECIFIED ERECTILE DYSFUNCTION TYPE: ICD-10-CM

## 2021-02-12 PROCEDURE — 90471 IMMUNIZATION ADMIN: CPT | Performed by: STUDENT IN AN ORGANIZED HEALTH CARE EDUCATION/TRAINING PROGRAM

## 2021-02-12 PROCEDURE — 90715 TDAP VACCINE 7 YRS/> IM: CPT | Performed by: STUDENT IN AN ORGANIZED HEALTH CARE EDUCATION/TRAINING PROGRAM

## 2021-02-12 PROCEDURE — 99215 OFFICE O/P EST HI 40 MIN: CPT | Mod: 25 | Performed by: STUDENT IN AN ORGANIZED HEALTH CARE EDUCATION/TRAINING PROGRAM

## 2021-02-12 RX ORDER — SILDENAFIL 100 MG/1
100 TABLET, FILM COATED ORAL DAILY PRN
Qty: 30 TABLET | Refills: 1 | Status: SHIPPED | OUTPATIENT
Start: 2021-02-12 | End: 2021-04-18

## 2021-02-12 RX ORDER — PRENATAL VIT 91/IRON/FOLIC/DHA 28-975-200
COMBINATION PACKAGE (EA) ORAL 2 TIMES DAILY
Qty: 30 G | Refills: 3 | Status: SHIPPED | OUTPATIENT
Start: 2021-02-12 | End: 2021-07-01

## 2021-02-12 RX ORDER — SILDENAFIL 100 MG/1
100 TABLET, FILM COATED ORAL DAILY PRN
Qty: 30 TABLET | Refills: 1 | Status: SHIPPED | OUTPATIENT
Start: 2021-02-12 | End: 2021-02-12

## 2021-02-12 NOTE — PATIENT INSTRUCTIONS
Follow up in 4-6 weeks for recheck.  We can do the cyst removal if you want here, or can go to see the surgeon.    Use cream on chest and on stump area.  You can use up to 3x per day to help with itching   Use the viagra as needed

## 2021-02-12 NOTE — PROGRESS NOTES
There are no exam notes on file for this visit.    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  He states he has had fluids released but it has not completely gone away.     Derm Problem     Pt has also had a rash on his hands for months that will not clear up.  Also has a rash on his chest and armpit.     Medication Reconciliation     Complete.      Establish Care     Pt would also like Dr. Lang to be his new PCP.  Was a long time patient of Dr. Antony but did not care for Dr. Metzger.       Labs Only     Pt would like to know his blood type with the pandemic.     Edema     Pt has had some edema in his R leg and foot and he is wondering what it is from.      Wart     Pt has had a wart on his R hand that has been burned off several times with no relief.      Covid Concern     Pt would like to discuss covid questions.      Allergies     Pt states he has been sneezing and have a runny nose out of nowhere and it will go away just as quickly as it came about.      Patient presents today to establish care.  He is a previous patient of Dr. Antony.  He has a history of a amputation of his left lower leg below the knee secondary to complex trauma in 2017.  Also is a regular smoker, has hypertension, and has had difficulty with pain in his back and knees, worsening since the amputation, and need to wear the prosthetic.  He also is had a history of a gallbladder removal.  He is here for evaluation of a couple  of issues.    There is a spot on his posterior right shoulder that is bothering him.  2 small elevated bumps.  One of them got large and painful, and then burst open and it has had some drainage.  Has been draining for a while and now it is feeling better.  Has been present for about 20 years, but this is the first time it has gotten big and had drainage.  He would like to have this removed.  Additionally there is a second spot just medial to it he would like removed as well.    Also having an area of rash on his upper chest.  This is itchy.  Gets raw and irritated.  Wants to know what this is and options treated.    Similarly is having rash present in his lower inner thigh area as well as over the knee and stump of his left leg.  Very itchy.  Blisters and peels at times.  Is worse when he is wearing his prosthetic more often.  He has not tried anything for this.  Was told that it was a reaction to his sleeve.  He also notes some pain in the stump area, worse with prolonged ambulation.  It does limit his activities, and he is interested in getting to the gym and doing more movement and exercise to build strength and lose some weight that he is put on since his accident.  It is frustrated by the pain and the limited mobility that he has right now.    Also gets swelling in his right foot and leg.  This happens almost daily, and is worse when he is on his feet.  Gets painful and sore.  He denies having difficulties with this before his surgery.    He previously worked as a .  Reports that he passed all of his exams for this, although he did not see a regular doctor for 20 years.  Reports blood pressure was somewhat elevated but not above 150.  Never had limitations on his driving, and never had to take medications.  He is very opposed to taking medications.  Does not want to have to rely on them.  Feels like the medications make you worse and just lead you to needing more medications.  Is not interested in  a blood pressure medicine today.    He is interested in interventions for difficulties with his erection.  This has been worse over the last year.  Has difficulty getting firm, staying firm, and maintaining his erection.  No difficulties with desire.  His partner is with him today.  Both of them are interested in medications that would help with this.  He has lots of great questions about how the medication might work and why he is having difficulties with this.      OBJECTIVE:  Vitals: BP (!) 158/83   Pulse 95   Temp 98.3  F (36.8  C) (Oral)   Resp 16   Wt 112.5 kg (248 lb)   SpO2 97%   BMI 32.72 kg/m    BMI= Body mass index is 32.72 kg/m .  Vitals:  Vitals are reviewed and are within the normal range  Gen:  Alert, pleasant, no acute distress  Psych: Mood and affect are appropriate.  His speech is somewhat pressured, and quite tangential.  Cardiac:  Regular rate and rhythm, no murmurs, rubs or gallops  Respiratory:  Lungs clear to auscultation bilaterally  Back: He has a small 1 cm in diameter palpable cyst in the right upper back.  About 2 cm lateral there is an open area of what looks to be a draining cyst.  No extending erythema, no warmth, the drainage is dried and crusted over.  Both appear to be ruptured and unruptured sebaceous cyst.  Chest: There are small annular mildly raised patches in the mid chest consistent with a fungal infection.  Abdomen:  Soft, non-tender, non-distended, bowel sounds positive  Extremities: Bilateral lower extremities were examined.  On the left side he has a below the knee amputation.  The stump is cool, but no evidence of infection, opening or rubbing.  He does have extending erythematous scaling patch present from the stump all the way to above the knee with a second patch in his medial left thigh region.  This too appears fungal to me.  Evidence of some blistering and excoriation present.  His right lower extremity shows 1+ pitting edema in the mid shin down to the foot.   Pulses are palpable and strong but this extremities also somewhat cool to the touch.  Normal sensation.      ASSESSMENT AND PLAN:      Jesus was seen today for mass, derm problem, medication reconciliation, establish care, labs only, edema, wart, covid concern and allergies.Multiple issues discussed today.  Reviewed previous notes from Dr. Metzger and Dr. Antony.    Diagnoses and all orders for this visit:    Rash.  Rash present on anterior chest and stump area of leg.  I think this is fungal from moisture and some cross-contamination between areas.  Recommended Lamisil cream.  Prescription provided.  -     terbinafine (LAMISIL) 1 % external cream; Apply topically 2 times daily    Edema, unspecified type. Pain of right lower leg   1+ right leg edema.  Could be secondary to hypertension or microvascular disease with his history of smoking.  No evidence for DVT.  He does not want to take medications.  We will try a compression stocking and see if this helps with some of the pain and swelling.  -     Compression Sleeve/Stocking Order for DME - ONLY FOR DME    Erectile dysfunction, unspecified erectile dysfunction type.  We discussed that this could be secondary to his hypertension, as well as his smoking, as well as simply getting older.  While he has poorly controlled hypertension, he denies chest pain, shortness of breath, or other signs of unstable heart disease.  Feel it is safe to prescribe Viagra.  Discussed how the medication works and how to use it effectively.  -     sildenafil (VIAGRA) 100 MG tablet; Take 1 tablet (100 mg) by mouth daily as needed (erectile dysfunction)    Essential hypertension.  Blood pressure above goal today.  Discussed that using a medication can improve his health, improve his erections, and would be my recommendation today.  He is not interested in daily medication as he feels this will just lead to further medication needs.  We will continue to discuss.    Smoker.  He is not  interested in quitting today.    Traumatic amputation of left lower extremity, subsequent encounter (H).  Discussed that physical therapy or increased training with his prosthetic may help increase his function, and decrease his pain.  He is very motivated to get back to the gym and be more active.  We will continue to explore ways for him to maintain his strength and health.    Sebaceous cyst.  Discussed that we could do a surgical referral for removal or could do this here.  He will set up appointment to have the cyst removed here.    Other orders  -     TDAP VACCINE (Adacel, Boostrix)  [[id number]]    I spent 45 minutes with patient greater than 50 spent in counseling and coordination of care.    Patient Instructions   Follow up in 4-6 weeks for recheck.  We can do the cyst removal if you want here, or can go to see the surgeon.    Use cream on chest and on stump area.  You can use up to 3x per day to help with itching   Use the viagra as needed       Follow up in 2-4 weeks for cyst removal.      Nika Lang MD

## 2021-03-19 ENCOUNTER — OFFICE VISIT (OUTPATIENT)
Dept: FAMILY MEDICINE | Facility: CLINIC | Age: 61
End: 2021-03-19
Payer: COMMERCIAL

## 2021-03-19 VITALS
OXYGEN SATURATION: 97 % | RESPIRATION RATE: 16 BRPM | DIASTOLIC BLOOD PRESSURE: 87 MMHG | HEART RATE: 95 BPM | SYSTOLIC BLOOD PRESSURE: 141 MMHG | TEMPERATURE: 97.7 F

## 2021-03-19 DIAGNOSIS — B07.8 COMMON WART: ICD-10-CM

## 2021-03-19 DIAGNOSIS — L30.1 DYSHIDROTIC ECZEMA: Primary | ICD-10-CM

## 2021-03-19 DIAGNOSIS — L72.3 SEBACEOUS CYST: ICD-10-CM

## 2021-03-19 DIAGNOSIS — N52.9 ERECTILE DYSFUNCTION, UNSPECIFIED ERECTILE DYSFUNCTION TYPE: ICD-10-CM

## 2021-03-19 PROCEDURE — 99213 OFFICE O/P EST LOW 20 MIN: CPT | Mod: 25 | Performed by: STUDENT IN AN ORGANIZED HEALTH CARE EDUCATION/TRAINING PROGRAM

## 2021-03-19 PROCEDURE — 17110 DESTRUCTION B9 LES UP TO 14: CPT | Mod: 59 | Performed by: STUDENT IN AN ORGANIZED HEALTH CARE EDUCATION/TRAINING PROGRAM

## 2021-03-19 PROCEDURE — 11403 EXC TR-EXT B9+MARG 2.1-3CM: CPT | Performed by: STUDENT IN AN ORGANIZED HEALTH CARE EDUCATION/TRAINING PROGRAM

## 2021-03-19 RX ORDER — TADALAFIL 20 MG/1
20 TABLET ORAL DAILY PRN
Qty: 30 TABLET | Refills: 0 | Status: SHIPPED | OUTPATIENT
Start: 2021-03-19

## 2021-03-19 RX ORDER — CLOBETASOL PROPIONATE 0.5 MG/G
CREAM TOPICAL 2 TIMES DAILY
Qty: 30 G | Refills: 0 | Status: SHIPPED | OUTPATIENT
Start: 2021-03-19 | End: 2021-06-30

## 2021-04-06 ENCOUNTER — TELEPHONE (OUTPATIENT)
Dept: FAMILY MEDICINE | Facility: CLINIC | Age: 61
End: 2021-04-06

## 2021-04-06 NOTE — TELEPHONE ENCOUNTER
[facility] phone call message- general phone call:    Reason for call: Pt only wants to speak with Vicki about an up coming appt he has.    Return call needed: Yes    OK to leave a message on voice mail? Yes    Primary language: English      needed? No    Call taken on April 6, 2021 at 11:19 AM by Basilio Shankar

## 2021-04-16 ENCOUNTER — OFFICE VISIT (OUTPATIENT)
Dept: FAMILY MEDICINE | Facility: CLINIC | Age: 61
End: 2021-04-16
Payer: COMMERCIAL

## 2021-04-16 VITALS
BODY MASS INDEX: 33.46 KG/M2 | RESPIRATION RATE: 16 BRPM | DIASTOLIC BLOOD PRESSURE: 89 MMHG | TEMPERATURE: 97.9 F | HEART RATE: 95 BPM | SYSTOLIC BLOOD PRESSURE: 150 MMHG | WEIGHT: 253.6 LBS | OXYGEN SATURATION: 94 %

## 2021-04-16 DIAGNOSIS — Z00.00 PREVENTATIVE HEALTH CARE: ICD-10-CM

## 2021-04-16 DIAGNOSIS — Z00.00 ROUTINE GENERAL MEDICAL EXAMINATION AT A HEALTH CARE FACILITY: Primary | ICD-10-CM

## 2021-04-16 DIAGNOSIS — D64.9 ANEMIA, UNSPECIFIED TYPE: ICD-10-CM

## 2021-04-16 LAB
% IMMATURE GRANULOCYTES: 0 % (ref 0–0)
ABS IMMATURE GRANULOCYTES: 0 10E9/L (ref 0–0)
ALBUMIN SERPL-MCNC: 4.8 MG/DL (ref 3.3–4.9)
ALP SERPL-CCNC: 97.3 U/L (ref 40–150)
ALT SERPL-CCNC: 49.4 U/L (ref 0–45)
AST SERPL-CCNC: 33.3 U/L (ref 0–55)
BASOPHILS # BLD AUTO: 0 10E9/L (ref 0–0.2)
BASOPHILS NFR BLD AUTO: 0 % (ref 0–2)
BILIRUB SERPL-MCNC: 0.4 MG/DL (ref 0.2–1.3)
BILIRUBIN DIRECT: 0.1 MG/DL (ref 0–0.2)
BUN SERPL-MCNC: 17.9 MG/DL (ref 7–21)
CALCIUM SERPL-MCNC: 9.7 MG/DL (ref 8.5–10.1)
CHLORIDE SERPLBLD-SCNC: 101.9 MMOL/L (ref 98–110)
CHOLEST SERPL-MCNC: 207.4 MG/DL (ref 0–200)
CHOLEST/HDLC SERPL: 4.1 {RATIO} (ref 0–5)
CO2 SERPL-SCNC: 23.1 MMOL/L (ref 20–32)
CREAT SERPL-MCNC: 1 MG/DL (ref 0.7–1.3)
EOSINOPHIL # BLD AUTO: 0.4 10E9/L (ref 0–0.7)
EOSINOPHIL NFR BLD AUTO: 4 % (ref 0–6)
ERYTHROCYTE [DISTWIDTH] IN BLOOD BY AUTOMATED COUNT: 13 % (ref 10–15)
GFR SERPL CREATININE-BSD FRML MDRD: 83.8 ML/MIN/1.7 M2
GLUCOSE SERPL-MCNC: 136.5 MG'DL (ref 70–99)
HBA1C MFR BLD: 5.8 % (ref 4.1–5.7)
HCT VFR BLD AUTO: 51.9 % (ref 40–53)
HDLC SERPL-MCNC: 50.5 MG/DL
HEMOGLOBIN: 16.8 G/DL (ref 13.3–17.7)
LDLC SERPL CALC-MCNC: 99 MG/DL (ref 0–129)
LYMPHOCYTES # BLD AUTO: 2.3 10E9/L (ref 0.8–5.3)
LYMPHOCYTES NFR BLD AUTO: 21.2 % (ref 20–48)
MCH RBC QN AUTO: 29.4 PG (ref 26.5–35)
MCHC RBC AUTO-ENTMCNC: 32.4 G/DL (ref 32–36)
MCV RBC AUTO: 90.9 FL (ref 78–100)
MONOCYTES # BLD AUTO: 0.8 10E9/L (ref 0–1.3)
MONOCYTES NFR BLD AUTO: 8 % (ref 0–12)
NEUTROPHILS # BLD AUTO: 7.3 10E9/L (ref 1.6–8.3)
NEUTROPHILS NFR BLD AUTO: 66.9 % (ref 40–75)
PLATELET # BLD AUTO: 293 10E9/L (ref 150–450)
POTASSIUM SERPL-SCNC: 4.4 MMOL/L (ref 3.2–4.6)
PROT SERPL-MCNC: 7.7 G/DL (ref 6.8–8.8)
RBC # BLD AUTO: 5.7 10E12/L (ref 4.4–5.9)
SODIUM SERPL-SCNC: 136.4 MMOL/L (ref 132–142)
TRIGL SERPL-MCNC: 288.5 MG/DL (ref 0–150)
TSH SERPL DL<=0.05 MIU/L-ACNC: 2.22 UIU/ML (ref 0.3–5)
VLDL CHOLESTEROL: 57.7 MG/DL (ref 7–32)
WBC # BLD AUTO: 10.8 10E9/L (ref 4–11)

## 2021-04-16 PROCEDURE — 80061 LIPID PANEL: CPT | Performed by: STUDENT IN AN ORGANIZED HEALTH CARE EDUCATION/TRAINING PROGRAM

## 2021-04-16 PROCEDURE — 85025 COMPLETE CBC W/AUTO DIFF WBC: CPT | Performed by: STUDENT IN AN ORGANIZED HEALTH CARE EDUCATION/TRAINING PROGRAM

## 2021-04-16 PROCEDURE — 80048 BASIC METABOLIC PNL TOTAL CA: CPT | Performed by: STUDENT IN AN ORGANIZED HEALTH CARE EDUCATION/TRAINING PROGRAM

## 2021-04-16 PROCEDURE — 36415 COLL VENOUS BLD VENIPUNCTURE: CPT | Performed by: STUDENT IN AN ORGANIZED HEALTH CARE EDUCATION/TRAINING PROGRAM

## 2021-04-16 PROCEDURE — 99215 OFFICE O/P EST HI 40 MIN: CPT | Performed by: STUDENT IN AN ORGANIZED HEALTH CARE EDUCATION/TRAINING PROGRAM

## 2021-04-16 PROCEDURE — 83036 HEMOGLOBIN GLYCOSYLATED A1C: CPT | Performed by: STUDENT IN AN ORGANIZED HEALTH CARE EDUCATION/TRAINING PROGRAM

## 2021-04-16 PROCEDURE — 80076 HEPATIC FUNCTION PANEL: CPT | Performed by: STUDENT IN AN ORGANIZED HEALTH CARE EDUCATION/TRAINING PROGRAM

## 2021-04-16 NOTE — LETTER
April 19, 2021      Jesus JEFF Presley  [address] [place] [address]        Dear ,    We are writing to inform you of your test results.    Thank you for sharing so much of your medical history and experience with me.  I really appreciate it.  I look for to working together to get you feeling better in the future.  Here is a copy of your lab results.  You blood type is B positive.  Blood counts are good.  Thyroid levels are good.  Kidney testing is good.  Your blood sugars are a little high, and so is your long term blood sugar test, the A1c.  This shows that you have pre-diabetes.  We'll talk about this more at your follow up visit.  Your liver tests are just a little high.  Your cholesterol is a little high as well.  We'll talk about all of this together at your follow up visit.   Please call the clinic at [phone] if you have any questions.       Resulted Orders   Basic Metabolic Panel ([place])   Result Value Ref Range    Urea Nitrogen 17.9 7.0 - 21.0 mg/dL    Calcium 9.7 8.5 - 10.1 mg/dL    Chloride 101.9 98.0 - 110.0 mmol/L    Carbon Dioxide 23.1 20.0 - 32.0 mmol/L    Creatinine 1.0 0.7 - 1.3 mg/dL    Glucose 136.5 (H) 70.0 - 99.0 mg'dL    Potassium 4.4 3.2 - 4.6 mmol/L    Sodium 136.4 132.0 - 142.0 mmol/L    GFR Estimate 83.8 >60.0 mL/min/1.7 m2      Comment:      eGFR is calculated by the CKD-EPI creatinine equation, without race   adjustment. eGFR can be influenced by muscle mass, exercise, and diet. The   reported eGFR is an estimation only and is only applicable if the renal   function is stable.     Hemoglobin A1c (P )   Result Value Ref Range    Hemoglobin A1C 5.8 (H) 4.1 - 5.7 %   Lipid Panel ([place])   Result Value Ref Range    Cholesterol 207.4 (H) 0.0 - 200.0 mg/dL    Cholesterol/HDL Ratio 4.1 0.0 - 5.0    HDL Cholesterol 50.5 >40.0 mg/dL    LDL Cholesterol Calculated 99 0 - 129 mg/dL    Triglycerides 288.5 (H) 0.0 - 150.0 mg/dL    VLDL Cholesterol 57.7 (H) 7.0 -  32.0 mg/dL   Thyroid [place] ([facility])   Result Value Ref Range    TSH 2.22 0.30 - 5.00 uIU/mL    Narrative    Test performed by:  [facility]  [address] [place] [address]   Hepatic Panel ([place])   Result Value Ref Range    Bilirubin Total 0.4 0.2 - 1.3 mg/dL    Albumin 4.8 3.3 - 4.9 mg/dL    Alkaline Phosphatase 97.3 40.0 - 150.0 U/L    ALT 49.4 (H) 0.0 - 45.0 U/L    AST 33.3 0.0 - 55.0 U/L    Bilirubin Direct 0.1 0.0 - 0.2 mg/dL    Protein Total 7.7 6.8 - 8.8 g/dL   CBC with Diff Plt ([facility])   Result Value Ref Range    WBC 10.8 4.0 - 11.0 10e9/L    RBC 5.7 4.4 - 5.9 10e12/L    Hemoglobin 16.8 13.3 - 17.7 g/dL    Hematocrit 51.9 40.0 - 53.0 %    MCV 90.9 78.0 - 100.0 fL    MCH 29.4 26.5 - 35.0 pg    MCHC 32.4 32.0 - 36.0 g/dL    RDW 13 10 - 15 %    Platelets 293.0 150.0 - 450.0 10e9/L    % Neutrophils 66.9 40.0 - 75.0 %    % Lymphocytes 21.2 20.0 - 48.0 %    % Monocytes 8 0 - 12 %    % Eosinophils 4 0 - 6 %    % Basophils 0 0 - 2 %    % Immature Granulocytes 0 0 - 0 %    Absolute Neutrophil 7.3 1.6 - 8.3 10e9/L    Lymphocytes # 2.3 0.8 - 5.3 10e9/L    Absolute Monocytes 0.8 0.0 - 1.3 10e9/L    Absolute Eosinophils 0.4 0.0 - 0.7 10e9/L    Absolute Basophils 0.0 0.0 - 0.2 10e9/L    Abs Immature Granulocytes 0.0 0.0 - 0.0 10e9/L   ABO/Rh Type-HML ([facility])   Result Value Ref Range    ABO/Rh(D) B POS     Repeat ABO/Rh Typing (HML) B POS     Narrative    Test performed by:   [facility]  [address], [place] [address]       If you have any questions or concerns, please call the clinic at the number listed above.       Sincerely,      Nika Lang MD

## 2021-04-16 NOTE — PROGRESS NOTES
There are no exam notes on file for this visit.    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     Imm/Inj     Pt is due for PPSV-23 and Flu today.  Pt declines all immunizations today.      Medication Reconciliation     Complete.      Labs Only     Pt would like to know what blood type he is.      Patient presents today for a complete physical.  However, we decided to transition to a regular visit because he had a lot of past medical history information that he wanted to share today.  His biggest concern is he feels like his previous amputation was not necessary.  Feels like many of the medical problems that he has now including swelling in his right leg, high blood pressure, difficulties with erections, and weight gain, stemming from the amputation.  He recalls as a child in a younger man, he was very active.  He could walk forever and reports that his health in general were good.  He did not go to a doctor for many years between age 16 until he was in his mid 50s, but always felt healthy and was able to do the things he wanted to do.  He reflects that he wishes he had an advocate and someone to help him understand the process when he came in with his leg and foot injury.  Felt like the plan was to save his leg, and that he had no say when it was removed.  He endorses having some depression, sadness, and lack of interest as a result from this.  Feels like he does not resemble himself in the same way.  He is not able to be as physically active.  When he  came in for his leg injury, there was no significant blood loss, and so he does not understand why his leg was amputated.  He notices that many little things about how he feels and how his body feels have changed because of the amputation.    He reports a family history of diabetes in his brother.  Also describes his dad having medical problems including a possible heart attack.  He has 2 daughters whom he thinks are healthy.  He reports trying to eat healthy and has minimal fast food and junk food.  Still he continues to gain weight.  Does not like taking medications.  Describes having to be on IV antibiotics after his surgery.  Describes medicines making him feel worse rather than better.  He declines his immunizations today, and is not interested in a Covid shot either.  He wonders what his blood type is, and wonders if there is something in his blood that is making him not feel well.    We reviewed the area on his back where he had to sebaceous cyst removed.  This is healing well.  We reviewed the rash he has on his hands and his chest.  Only minimal improvements here, but he shares he has not been using the cream regularly.  Has trouble fitting it into his routine.  Does not like taking medications.    Expresses the desire to be able to feel like himself again.  He would love to travel to [place], would love to spend more time on his motorcycle and see the world.  Does not think he has the energy or strength or able to do something like this, and does not know who he would travel with.  Shares that the loss of his leg has been a major struggle for him.    OBJECTIVE:  Vitals: BP (!) 150/89   Pulse 95   Temp 97.9  F (36.6  C) (Oral)   Resp 16   Wt 115 kg (253 lb 9.6 oz)   SpO2 94%   BMI 33.46 kg/m    BMI= Body mass index is 33.46 kg/m .  Objective:    Vitals: Vitals have been reviewed.  Blood pressure is elevated.  He has gained about 20 pounds since 2019.  General: Pleasant, alert, no acute  distress.  Skin: 2 well-healed vertical incisions on his upper right shoulder.  Annular rash still present on his mid chest and bilateral thickening, scaling, and erythema on his hands.  Psych: Mood and affect are flat.  He is appropriately sad recounting his previous injury.     Results for orders placed or performed in visit on 04/16/21   Basic Metabolic Panel ([place])     Status: Abnormal   Result Value Ref Range    Urea Nitrogen 17.9 7.0 - 21.0 mg/dL    Calcium 9.7 8.5 - 10.1 mg/dL    Chloride 101.9 98.0 - 110.0 mmol/L    Carbon Dioxide 23.1 20.0 - 32.0 mmol/L    Creatinine 1.0 0.7 - 1.3 mg/dL    Glucose 136.5 (H) 70.0 - 99.0 mg'dL    Potassium 4.4 3.2 - 4.6 mmol/L    Sodium 136.4 132.0 - 142.0 mmol/L    GFR Estimate 83.8 >60.0 mL/min/1.7 m2   Hemoglobin A1c ([facility])     Status: Abnormal   Result Value Ref Range    Hemoglobin A1C 5.8 (H) 4.1 - 5.7 %   Lipid Panel ([place])     Status: Abnormal   Result Value Ref Range    Cholesterol 207.4 (H) 0.0 - 200.0 mg/dL    Cholesterol/HDL Ratio 4.1 0.0 - 5.0    HDL Cholesterol 50.5 >40.0 mg/dL    LDL Cholesterol Calculated 99 0 - 129 mg/dL    Triglycerides 288.5 (H) 0.0 - 150.0 mg/dL    VLDL Cholesterol 57.7 (H) 7.0 - 32.0 mg/dL   Thyroid [place] ([facility])     Status: None   Result Value Ref Range    TSH 2.22 0.30 - 5.00 uIU/mL    Narrative    Test performed by:  [facility]  [address] [place] [address]   Hepatic Panel ([place])     Status: Abnormal   Result Value Ref Range    Bilirubin Total 0.4 0.2 - 1.3 mg/dL    Albumin 4.8 3.3 - 4.9 mg/dL    Alkaline Phosphatase 97.3 40.0 - 150.0 U/L    ALT 49.4 (H) 0.0 - 45.0 U/L    AST 33.3 0.0 - 55.0 U/L    Bilirubin Direct 0.1 0.0 - 0.2 mg/dL    Protein Total 7.7 6.8 - 8.8 g/dL   CBC with Diff Plt ([facility] )     Status: None   Result Value Ref Range    WBC 10.8 4.0 - 11.0 10e9/L    RBC 5.7 4.4 - 5.9 10e12/L    Hemoglobin 16.8 13.3 - 17.7 g/dL    Hematocrit 51.9 40.0 - 53.0 %    MCV  "90.9 78.0 - 100.0 fL    MCH 29.4 26.5 - 35.0 pg    MCHC 32.4 32.0 - 36.0 g/dL    RDW 13 10 - 15 %    Platelets 293.0 150.0 - 450.0 10e9/L    % Neutrophils 66.9 40.0 - 75.0 %    % Lymphocytes 21.2 20.0 - 48.0 %    % Monocytes 8 0 - 12 %    % Eosinophils 4 0 - 6 %    % Basophils 0 0 - 2 %    % Immature Granulocytes 0 0 - 0 %    Absolute Neutrophil 7.3 1.6 - 8.3 10e9/L    Lymphocytes # 2.3 0.8 - 5.3 10e9/L    Absolute Monocytes 0.8 0.0 - 1.3 10e9/L    Absolute Eosinophils 0.4 0.0 - 0.7 10e9/L    Absolute Basophils 0.0 0.0 - 0.2 10e9/L    Abs Immature Granulocytes 0.0 0.0 - 0.0 10e9/L   ABO/Rh Type-HML ([facility])     Status: None   Result Value Ref Range    ABO/Rh(D) B POS     Repeat ABO/Rh Typing (HML) B POS     Narrative    Test performed by:   [facility]  [address], [place] [address]         ASSESSMENT AND PLAN:      Jesus was seen today for physical, imm/inj, medication reconciliation and labs only.  Patient presents today for complete physical, however we will change this to an acute visit, given his need to process his past medical history, and continued struggles over the loss of his leg.  We did discuss doing psychotherapy or using medications to help him process this, and he is not interested right now.  Really felt it was important that I know this history.  We discussed recommended tests for preventative health and ways to help improve his energy, function, and quality of life.  He agrees to do a fit test, check liver tests, diabetes, kidney tests, and do a CBC to rule out \"something wrong in his blood.\"      We will have him return for the physical exam portions of the visit, as we did not have time for this today.  We will need to continue to discuss blood pressure, health goals, and better understand his concern about potential side effects from medications and immunizations.  I thanked him for being so honest with me during the visit today.    Diagnoses and all orders for this visit:    Routine " general medical examination at a health care facility  -     Fecal Occult Blood - FIT, iFOB ([facility]); Future  -     ABO/Rh Type-HML ([facility])    Preventative health care  -     Basic Metabolic Panel ([place])  -     Hemoglobin A1c ([facility])  -     Lipid Panel ([place])  -     Thyroid [place] ([facility])  -     Hepatic Panel ([place])    Anemia, unspecified type  -     CBC with Diff Plt ([facility])  -     Cancel: ABO and Rh    -Spent 50 minutes with the patient, greater than 80% on counseling and coordination of care.    Patient Instructions   Stop in lab for blood work  Do FIT test  Bring back a list of things you want to work on.          Follow up in 2-3 weeks for complete physical with physical exam portion.      Nika Lang MD

## 2021-04-17 LAB
ABO + RH BLD: NORMAL
REPEAT ABO/RH TYPING (HML): NORMAL

## 2021-04-19 DIAGNOSIS — Z00.00 ROUTINE GENERAL MEDICAL EXAMINATION AT A HEALTH CARE FACILITY: ICD-10-CM

## 2021-04-19 NOTE — RESULT ENCOUNTER NOTE
Jesus rPesley-    Thank you for sharing so much of your medical history and experience with me.  I really appreciate it.  I look for to working together to get you feeling better in the future.  Here is a copy of your lab results.  You blood type is B positive.  Blood counts are good.  Thyroid levels are good.  Kidney testing is good.  Your blood sugars are a little high, and so is your long term blood sugar test, the A1c.  This shows that you have pre-diabetes.  We'll talk about this more at your follow up visit.  Your liver tests are just a little high.  Your cholesterol is a little high as well.  We'll talk about all of this together at your follow up visit.   Please call the clinic at [phone] if you have any questions.      Nika Lang MD    Please send results to patient.

## 2021-04-27 ENCOUNTER — OFFICE VISIT (OUTPATIENT)
Dept: FAMILY MEDICINE | Facility: CLINIC | Age: 61
End: 2021-04-27
Payer: COMMERCIAL

## 2021-04-27 VITALS
TEMPERATURE: 97.6 F | OXYGEN SATURATION: 99 % | HEART RATE: 72 BPM | RESPIRATION RATE: 16 BRPM | SYSTOLIC BLOOD PRESSURE: 154 MMHG | DIASTOLIC BLOOD PRESSURE: 85 MMHG

## 2021-04-27 DIAGNOSIS — I10 ESSENTIAL HYPERTENSION: ICD-10-CM

## 2021-04-27 DIAGNOSIS — Z00.00 ROUTINE GENERAL MEDICAL EXAMINATION AT A HEALTH CARE FACILITY: Primary | ICD-10-CM

## 2021-04-27 DIAGNOSIS — E78.5 HYPERLIPIDEMIA LDL GOAL <100: ICD-10-CM

## 2021-04-27 PROCEDURE — 99213 OFFICE O/P EST LOW 20 MIN: CPT | Performed by: STUDENT IN AN ORGANIZED HEALTH CARE EDUCATION/TRAINING PROGRAM

## 2021-04-27 NOTE — PATIENT INSTRUCTIONS
Watch your diet!      Work on cutting back on fried and greasy foods  Work on cutting back on salt.  Rinse your canned vegetables.  Eat more fresh fruits and veggies.      You have high blood pressure, high cholesterol, and pre-diabetes.  Watching your diet can help with all of these things.

## 2021-04-28 NOTE — PROGRESS NOTES
There are no exam notes on file for this visit.    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     Follow Up     Pt is here to follow up from his last visit.     Medication Reconciliation     Complete.      Patient presents today for follow-up of lab results from his complete physical, and review of his chronic medical problems.  He again shares his concerns about what happened previously with his amputation, and the distress that this has caused him.  Is noting an area on his stump that is irritated and seems to pull apart.  His activity is often limited because of his prosthetic leg.    We completed a full review of systems.    General: No fevers or chills.  Has gradually gained weight since his surgery 4 years ago.  No headaches, dizziness or lightheadedness.  Eyes: He uses readers.  Notes some blurring with far distance.  Ears: No pain.  Does have some decreased hearing.  This was noted previously on his evaluations for work as a .  Nose and sinuses.  Does have some difficulty with intermittent drainage.  Worse on the right side of the nose than the left.  He has never had allergies as a kid but thinks he might be developing them as an adult.  Mouth: He endorses difficulty with his teeth.  Has lost most of his posterior molars.  Is worried he will continue to lose more teeth.  Does not like seeing dentist, and instead goes to [facility] to get them pulled.  No difficulty swallowing.  No sore throat.  Neck: No neck  pain, no thyroid issues, no lumps or bumps.  Cardiac: No chest pain, no shortness of breath with exertion, no palpitations.  No paroxysmal nocturnal dyspnea, no orthopnea.  Resp: He is a chronic daily smoker.  No cough, no wheezing, no shortness of breath.  Abdomen: No acid reflux or heartburn.  No difficulties with constipation or diarrhea.  He does have to get up during the night to urinate, but no urinary leakage.  Does have some difficulty with keeping and maintaining erections.  Extremities: Rash bilaterally on his hands.  He does get intermittent swelling in the right leg.  Some swelling and pain in the stump on the left leg.  No numbness or tingling.  Good strength.    OBJECTIVE:  Vitals: BP (!) 154/85   Pulse 72   Temp 97.6  F (36.4  C) (Oral)   Resp 16   SpO2 99%   BMI= There is no height or weight on file to calculate BMI.  Objective:    Vitals:  Vitals are reviewed and are within the normal range  Gen:  Alert, pleasant, no acute distress  Head:  Normal cephalic, atraumatic  Ears:  Tympanic membranes viewed bilaterally, no erythema, bulging, or fluid present  Throat:  Clear.  Non-erythematous and without exudate, poor dentition  Neck:  No cervical lymphadenopathy, normal thyroid.  No posterior tenderness.  Back: No paraspinous muscle tenderness.  No CVA tenderness.  Cardiac:  Regular rate and rhythm, no murmurs, rubs or gallops  Respiratory:  Lungs clear to auscultation bilaterally  Abdomen:  Soft, non-tender, non-distended, bowel sounds positive  Extremities:  Warm, well-perfused, pulses 2+/4, strength 5 out of 5 throughout.  He has trace to 1+ pitting edema in his right leg, and similar swelling in his left stump.  There are some areas where the previous approximation sites have opened up some.  Areas of rubbing with rash, but no evidence of cellulitis or infection.  Skin:  Mucous membranes moist.  He has multiple areas of skin irritation and rash, including some spots on his head, erythema and some  peeling on his hands, and rash over his anterior chest.  Couple patches on his legs as well.    We discussed his lab results from last visit.  Renal function is improved.  Hemoglobin is stable.  Blood counts are appropriate.  His cholesterol is somewhat elevated, in particular his triglycerides, and his A1c shows prediabetes.    ASSESSMENT AND PLAN:      Jesus was seen today for hypertension, follow up and medication reconciliation.  We reviewed his lab results, did a complete exam, and discussed options for treatment of hypertension and hyperlipidemia.  He does not want to take medication regularly.  Discussed DASH diet and low-cholesterol foods.  Encouraged fruits and vegetables, and fresh and frozen versus canned.  He was sent home with a list of his lab results, and further information about these diets.  We will follow up in 2 months.    Diagnoses and all orders for this visit:    Routine general medical examination at a health care facility    Essential hypertension    Hyperlipidemia LDL goal <100        Patient Instructions   Watch your diet!      Work on cutting back on fried and greasy foods  Work on cutting back on salt.  Rinse your canned vegetables.  Eat more fresh fruits and veggies.      You have high blood pressure, high cholesterol, and pre-diabetes.  Watching your diet can help with all of these things.          Nika Lang MD

## 2021-05-27 ENCOUNTER — RECORDS - HEALTHEAST (OUTPATIENT)
Dept: ADMINISTRATIVE | Facility: CLINIC | Age: 61
End: 2021-05-27

## 2021-06-02 VITALS — HEIGHT: 73 IN | BODY MASS INDEX: 30.01 KG/M2 | WEIGHT: 226.41 LBS

## 2021-06-02 NOTE — TELEPHONE ENCOUNTER
[facility] [phone]  Referring Provider: Cristi Antony MD  DX:traumatic amputation of left lower extremity    Ref./rec. Were received Wed. 10/23 @ 4:37.    Records given to Chikis to review.

## 2021-06-02 NOTE — PATIENT INSTRUCTIONS - HE
Labs today.    I will also order an MRI of your left leg    I will call you with these results to discuss next steps.    Call clinic with questions.
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
## 2021-06-02 NOTE — TELEPHONE ENCOUNTER
Date: 10/31/2019 Status: [facility]   Time: 9:30 AM Length: 60   Visit Type: NEW PATIENT [[id number]] Copay: $0.00   Provider: Greg Walters MD Department: INF INFECTIOUS DISEASE   Referring Provider:   [facility]: [id number]   Notes: Cristi Antony MD  DX:traumatic amputation of left lower extremity

## 2021-06-03 VITALS
BODY MASS INDEX: 31.14 KG/M2 | HEART RATE: 88 BPM | TEMPERATURE: 98 F | WEIGHT: 236 LBS | DIASTOLIC BLOOD PRESSURE: 80 MMHG | SYSTOLIC BLOOD PRESSURE: 146 MMHG

## 2021-06-03 NOTE — TELEPHONE ENCOUNTER
Per Dr Walters, called pt to inform finding below.  Pt was impatient and hesitant on understanding information given.  At the end of phone call pt took notes on recommended plan provider suggested. (defer to PCP with suggestions of pain clinic/neurology).  Pt asked whether MRI can be seen by neurologist if he were to go or would he need another one. Assured that that would be in his record and if they needed to see it, it can be requested as PERICO.    Pt request call back from provider once he is back in clinic, Told pt earliest would be 11/11/19 because provider is out of clinic this week. Pt is ok with that.

## 2021-06-03 NOTE — TELEPHONE ENCOUNTER
MRI results reviewed. No osteomyelitis seen. The report does indicate small ulceration and surrounding cellulitis. These findings are not present on exam, thus I think this is an over-read. White count and CRP are normal.    I do not believe his symptoms are due to infection. The MRI does not likely stump neuroma which could be the cause of his pain. I will defer to his PCP whether he should be seen in pain clinic or neurology for this.    I attempted to call patient on his listed number but there was no response and no voice mailbox.    Greg Walters MD  [facility]  Direct messaging: [facility] Paging  On-Call ID provider: [phone], option: [phone]
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
## 2021-06-23 NOTE — ANESTHESIA CARE TRANSFER NOTE
Last vitals:   Vitals:    01/19/19 1503   BP: (!) 155/93   Pulse: 76   Resp: 20   Temp: 36.8  C (98.3  F)   SpO2: 98%     Patient's level of consciousness is awake  Spontaneous respirations: yes  Maintains airway independently: yes  Dentition unchanged: yes  Oropharynx: oropharynx clear of all foreign objects    QCDR Measures:  ASA# 20 - Surgical Safety Checklist: WHO surgical safety checklist completed prior to induction    PQRS# 430 - Adult PONV Prevention: 4558F - Pt received => 2 anti-emetic agents (different classes) preop & intraop  ASA# 8 - Peds PONV Prevention: NA - Not pediatric patient, not GA or 2 or more risk factors NOT present  PQRS# 424 - Katt-op Temp Management: 4559F - At least one body temp DOCUMENTED => 35.5C or 95.9F within required timeframe  PQRS# 426 - PACU Transfer Protocol: - Transfer of care checklist used  ASA# 14 - Acute Post-op Pain: ASA14B - Patient did NOT experience pain >= 7 out of 10

## 2021-06-23 NOTE — ANESTHESIA POSTPROCEDURE EVALUATION
Patient: Jesus Presley  CHOLECYSTECTOMY, LAPAROSCOPIC  Anesthesia type: general    Patient location: PACU  Last vitals:   Vitals:    01/19/19 1530   BP: 167/84   Pulse: 71   Resp: 17   Temp:    SpO2: 98%     Post vital signs: stable  Level of consciousness: awake and responds to simple questions  Post-anesthesia pain: pain controlled  Post-anesthesia nausea and vomiting: no  Pulmonary: unassisted, return to baseline  Cardiovascular: stable and blood pressure at baseline  Hydration: adequate  Anesthetic events: no    QCDR Measures:  ASA# 11 - Katt-op Cardiac Arrest: ASA11B - Patient did NOT experience unanticipated cardiac arrest  ASA# 12 - Katt-op Mortality Rate: ASA12B - Patient did NOT die  ASA# 13 - PACU Re-Intubation Rate: ASA13B - Patient did NOT require a new airway mgmt  ASA# 10 - Composite Anes Safety: ASA10A - No serious adverse event    Additional Notes:
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
## 2021-06-30 ENCOUNTER — OFFICE VISIT (OUTPATIENT)
Dept: FAMILY MEDICINE | Facility: CLINIC | Age: 61
End: 2021-06-30
Payer: COMMERCIAL

## 2021-06-30 VITALS
BODY MASS INDEX: 32.59 KG/M2 | WEIGHT: 247 LBS | TEMPERATURE: 98 F | OXYGEN SATURATION: 98 % | HEART RATE: 96 BPM | SYSTOLIC BLOOD PRESSURE: 156 MMHG | RESPIRATION RATE: 16 BRPM | DIASTOLIC BLOOD PRESSURE: 98 MMHG

## 2021-06-30 DIAGNOSIS — R60.0 LOWER EXTREMITY EDEMA: Primary | ICD-10-CM

## 2021-06-30 DIAGNOSIS — L30.1 DYSHIDROTIC ECZEMA: ICD-10-CM

## 2021-06-30 DIAGNOSIS — I10 ESSENTIAL HYPERTENSION: ICD-10-CM

## 2021-06-30 DIAGNOSIS — R60.0 LOWER EXTREMITY EDEMA: ICD-10-CM

## 2021-06-30 PROCEDURE — 99214 OFFICE O/P EST MOD 30 MIN: CPT | Performed by: STUDENT IN AN ORGANIZED HEALTH CARE EDUCATION/TRAINING PROGRAM

## 2021-06-30 RX ORDER — FUROSEMIDE 40 MG
40 TABLET ORAL DAILY
Qty: 30 TABLET | Refills: 0 | Status: SHIPPED | OUTPATIENT
Start: 2021-06-30

## 2021-06-30 RX ORDER — FUROSEMIDE 40 MG
40 TABLET ORAL DAILY
Qty: 30 TABLET | Refills: 0 | Status: SHIPPED | OUTPATIENT
Start: 2021-06-30 | End: 2021-06-30

## 2021-06-30 RX ORDER — CLOBETASOL PROPIONATE 0.5 MG/G
CREAM TOPICAL 2 TIMES DAILY
Qty: 45 G | Refills: 0 | Status: SHIPPED | OUTPATIENT
Start: 2021-06-30 | End: 2021-07-14

## 2021-06-30 RX ORDER — CLOBETASOL PROPIONATE 0.5 MG/G
CREAM TOPICAL 2 TIMES DAILY
Qty: 45 G | Refills: 0 | Status: SHIPPED | OUTPATIENT
Start: 2021-06-30 | End: 2021-06-30

## 2021-06-30 NOTE — PATIENT INSTRUCTIONS
Keep it up with the exercise.  You have lost 6 pounds since April, and that is great.    Good goal to get down to 220 pounds.      Keep cooking at home, but try to add some addditional fruist and vegetables.      Write down the foods you eat for 3 days.      Cream 3x per day for 2 weeks.    Water pill 1 pill daily in the morning.  Also wear your compression stockings.

## 2021-07-02 ENCOUNTER — MEDICAL CORRESPONDENCE (OUTPATIENT)
Dept: HEALTH INFORMATION MANAGEMENT | Facility: CLINIC | Age: 61
End: 2021-07-02

## 2021-07-14 ENCOUNTER — OFFICE VISIT (OUTPATIENT)
Dept: FAMILY MEDICINE | Facility: CLINIC | Age: 61
End: 2021-07-14
Payer: COMMERCIAL

## 2021-07-14 VITALS
HEART RATE: 96 BPM | SYSTOLIC BLOOD PRESSURE: 160 MMHG | TEMPERATURE: 98.1 F | DIASTOLIC BLOOD PRESSURE: 97 MMHG | RESPIRATION RATE: 16 BRPM | OXYGEN SATURATION: 99 %

## 2021-07-14 DIAGNOSIS — R60.0 LOWER EXTREMITY EDEMA: Primary | ICD-10-CM

## 2021-07-14 DIAGNOSIS — I10 ESSENTIAL HYPERTENSION: ICD-10-CM

## 2021-07-14 DIAGNOSIS — L30.1 DYSHIDROTIC ECZEMA: ICD-10-CM

## 2021-07-14 DIAGNOSIS — Z00.00 PREVENTATIVE HEALTH CARE: ICD-10-CM

## 2021-07-14 PROCEDURE — 99214 OFFICE O/P EST MOD 30 MIN: CPT | Performed by: STUDENT IN AN ORGANIZED HEALTH CARE EDUCATION/TRAINING PROGRAM

## 2021-07-14 RX ORDER — CLOBETASOL PROPIONATE 0.5 MG/G
CREAM TOPICAL 2 TIMES DAILY
Qty: 60 G | Refills: 1 | Status: SHIPPED | OUTPATIENT
Start: 2021-07-14 | End: 2022-01-26

## 2021-07-14 NOTE — PROGRESS NOTES
There are no exam notes on file for this visit.    ASSESSMENT AND PLAN:      Jesus was seen today for hypertension and derm problem.  Multiple issues were discussed today.    Diagnoses and all orders for this visit:    Lower extremity edema.  This seems somewhat improved with the Lasix.  Has not improved his blood pressure.  Rash still present.  Will hold for now, and see if using cream will improve this rash more.  I do think a low-salt diet would help.    Dyshidrotic eczema.  We will refill the cream and increase the dose we can access the clobetasol using his good Rx program.  -     clobetasol (TEMOVATE) 0.05 % external cream; Apply topically 2 times daily    Essential hypertension.  Blood pressure remains high.  He would like to continue to work on diet and exercise and is going to try to cut out more of his added sugar and processed sugars in his diet.    Preventative health care.  Long discussion of diet and healthy eating today.  Suggested lifestyle clinic, but he is wanting to come in to talk with me more.  Requested that he work on increasing fresh fruits and vegetables, grocery shopping every 3 weeks instead of every 2 months to have more fresh thinking, and watching the nutrition labels for added sugar.  We will follow-up again in 1 month to reassess these dietary changes, and work on healthy eating, weight loss and improvement in blood pressure.        Patient Instructions   GOOD work on the breakfast!    Try eating more fruit.  Either fresh fruit (bananas, apples, orange).  Can do canned fruit but wash them first  Watch for the amount of sugar in different foods.   Try to add some veggies!  Can do fresh, frozen or canned.  Rinse out the canned vegetables.      Go to the grocery store more often.  Go to the grocery store every 3 weeks. The more you go the more fresh things you can get.        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      Derm Problem     Pt is also here to follow up on a rash.      Patient presents for follow-up today.  He brings with him a list of his foods that he is eaten over the last approximately 1 week.  Fairly consistently eats a bowl of honey neck crispy oats with milk in the morning.  Will often have a sandwich on wheat bread or wheat crackers that includes bologna, salami, or hard cheese.  He likes Swiss rolls, sandwich cookies, and will snack on these at times.  Does also eat some other bar type foods such as tacos, chicken from these, and rotisserie prime rib.  Makes his own homemade spaghetti and likes to make hot dish and other foods.  Very limited fruits and vegetables.  Ate one orange once.  No vegetables on the list.  He shares that he grocery shops about every 2 months.  Has difficulty with fresh foods because they do not last.  Like things that are crisp.  Wonders if he can try canned foods or canned vegetables.  We sent a lot of time discussing sugar and where to look on the nutrition label for this.  Discussed that added sugar is not a good part of the diet on a regular basis.    Has had fairly minimal exercise over the last couple weeks.  His truck is still broken so he is unable to kayak.  Has been taking his motorcycle out and enjoys this especially in the good weather.  He continues to have swelling in his legs, but thinks the swelling is better in the right leg since starting the water pill.  Does not like having to take the pill.  Does not seem like it  had much impact on his blood pressure either.  Still has a rash present on the leg.  This is quite itchy.    Has been using the cream regularly on his hands.  Not always able to do 3 times a day but more frequently.  He notes improvement in the symptoms, but worries that the rash may come back.    OBJECTIVE:  Vitals: BP (!) 160/97   Pulse 96   Temp 98.1  F (36.7  C) (Oral)   Resp 16   SpO2 99%   BMI= There is no height or weight on file to calculate BMI.  Objective:    Vitals:  Vitals are reviewed and blood pressure is significantly elevated.  Gen:  Alert, pleasant, no acute distress  Extremities: His lower extremity edema is improved.  Rash is improving but he has evidence of some thickening and scaling, consistent with eczema on the area on the medial side of his right leg.      Patient Instructions   GOOD work on the breakfast!    Try eating more fruit.  Either fresh fruit (bananas, apples, orange).  Can do canned fruit but wash them first  Watch for the amount of sugar in different foods.   Try to add some veggies!  Can do fresh, frozen or canned.  Rinse out the canned vegetables.      Go to the grocery store more often.  Go to the grocery store every 3 weeks. The more you go the more fresh things you can get.        Nika Lang MD

## 2021-07-14 NOTE — PATIENT INSTRUCTIONS
GOOD work on the breakfast!    Try eating more fruit.  Either fresh fruit (bananas, apples, orange).  Can do canned fruit but wash them first  Watch for the amount of sugar in different foods.   Try to add some veggies!  Can do fresh, frozen or canned.  Rinse out the canned vegetables.      Go to the grocery store more often.  Go to the grocery store every 3 weeks. The more you go the more fresh things you can get.

## 2022-01-26 ENCOUNTER — OFFICE VISIT (OUTPATIENT)
Dept: FAMILY MEDICINE | Facility: CLINIC | Age: 62
End: 2022-01-26
Payer: COMMERCIAL

## 2022-01-26 VITALS
SYSTOLIC BLOOD PRESSURE: 151 MMHG | DIASTOLIC BLOOD PRESSURE: 91 MMHG | TEMPERATURE: 97.9 F | OXYGEN SATURATION: 94 % | HEART RATE: 85 BPM | BODY MASS INDEX: 33.33 KG/M2 | WEIGHT: 252.6 LBS | RESPIRATION RATE: 16 BRPM

## 2022-01-26 DIAGNOSIS — S88.912D TRAUMATIC AMPUTATION OF LEFT LOWER EXTREMITY, SUBSEQUENT ENCOUNTER (H): Primary | ICD-10-CM

## 2022-01-26 DIAGNOSIS — N52.9 ERECTILE DYSFUNCTION, UNSPECIFIED ERECTILE DYSFUNCTION TYPE: ICD-10-CM

## 2022-01-26 DIAGNOSIS — L30.1 DYSHIDROTIC ECZEMA: ICD-10-CM

## 2022-01-26 DIAGNOSIS — R53.83 OTHER FATIGUE: ICD-10-CM

## 2022-01-26 DIAGNOSIS — R21 RASH: ICD-10-CM

## 2022-01-26 DIAGNOSIS — Z12.11 COLON CANCER SCREENING: ICD-10-CM

## 2022-01-26 LAB
BASOPHILS # BLD AUTO: 0.1 10E3/UL (ref 0–0.2)
BASOPHILS NFR BLD AUTO: 1 %
C REACTIVE PROTEIN LHE: 0.3 MG/DL (ref 0–0.8)
EOSINOPHIL # BLD AUTO: 0.3 10E3/UL (ref 0–0.7)
EOSINOPHIL NFR BLD AUTO: 3 %
ERYTHROCYTE [DISTWIDTH] IN BLOOD BY AUTOMATED COUNT: 12.8 % (ref 10–15)
ERYTHROCYTE [SEDIMENTATION RATE] IN BLOOD BY WESTERGREN METHOD: 7 MM/HR (ref 0–15)
HCT VFR BLD AUTO: 49.9 % (ref 40–53)
HGB BLD-MCNC: 16.5 G/DL (ref 13.3–17.7)
IMM GRANULOCYTES # BLD: 0 10E3/UL
IMM GRANULOCYTES NFR BLD: 1 %
LYMPHOCYTES # BLD AUTO: 1.8 10E3/UL (ref 0.8–5.3)
LYMPHOCYTES NFR BLD AUTO: 21 %
MCH RBC QN AUTO: 29.9 PG (ref 26.5–33)
MCHC RBC AUTO-ENTMCNC: 33.1 G/DL (ref 31.5–36.5)
MCV RBC AUTO: 91 FL (ref 78–100)
MONOCYTES # BLD AUTO: 0.7 10E3/UL (ref 0–1.3)
MONOCYTES NFR BLD AUTO: 9 %
NEUTROPHILS # BLD AUTO: 5.6 10E3/UL (ref 1.6–8.3)
NEUTROPHILS NFR BLD AUTO: 65 %
NRBC # BLD AUTO: 0 10E3/UL
NRBC BLD AUTO-RTO: 0 /100
PLATELET # BLD AUTO: 290 10E3/UL (ref 150–450)
RBC # BLD AUTO: 5.51 10E6/UL (ref 4.4–5.9)
RHEUMATOID FACT SER NEPH-ACNC: <15 IU/ML (ref 0–30)
TSH SERPL DL<=0.005 MIU/L-ACNC: 2.42 UIU/ML (ref 0.3–5)
WBC # BLD AUTO: 8.5 10E3/UL (ref 4–11)

## 2022-01-26 PROCEDURE — 85025 COMPLETE CBC W/AUTO DIFF WBC: CPT | Performed by: STUDENT IN AN ORGANIZED HEALTH CARE EDUCATION/TRAINING PROGRAM

## 2022-01-26 PROCEDURE — 99214 OFFICE O/P EST MOD 30 MIN: CPT | Performed by: STUDENT IN AN ORGANIZED HEALTH CARE EDUCATION/TRAINING PROGRAM

## 2022-01-26 PROCEDURE — 86431 RHEUMATOID FACTOR QUANT: CPT | Performed by: STUDENT IN AN ORGANIZED HEALTH CARE EDUCATION/TRAINING PROGRAM

## 2022-01-26 PROCEDURE — 85652 RBC SED RATE AUTOMATED: CPT | Performed by: STUDENT IN AN ORGANIZED HEALTH CARE EDUCATION/TRAINING PROGRAM

## 2022-01-26 PROCEDURE — 84443 ASSAY THYROID STIM HORMONE: CPT | Performed by: STUDENT IN AN ORGANIZED HEALTH CARE EDUCATION/TRAINING PROGRAM

## 2022-01-26 PROCEDURE — 86140 C-REACTIVE PROTEIN: CPT | Performed by: STUDENT IN AN ORGANIZED HEALTH CARE EDUCATION/TRAINING PROGRAM

## 2022-01-26 PROCEDURE — 86200 CCP ANTIBODY: CPT | Performed by: STUDENT IN AN ORGANIZED HEALTH CARE EDUCATION/TRAINING PROGRAM

## 2022-01-26 PROCEDURE — 36415 COLL VENOUS BLD VENIPUNCTURE: CPT | Performed by: STUDENT IN AN ORGANIZED HEALTH CARE EDUCATION/TRAINING PROGRAM

## 2022-01-26 RX ORDER — PRENATAL VIT 91/IRON/FOLIC/DHA 28-975-200
COMBINATION PACKAGE (EA) ORAL 2 TIMES DAILY
Qty: 15 G | Refills: 0 | Status: SHIPPED | OUTPATIENT
Start: 2022-01-26

## 2022-01-26 RX ORDER — CLOBETASOL PROPIONATE 0.5 MG/G
CREAM TOPICAL 2 TIMES DAILY
Qty: 60 G | Refills: 1 | Status: SHIPPED | OUTPATIENT
Start: 2022-01-26 | End: 2022-01-26

## 2022-01-26 RX ORDER — PRENATAL VIT 91/IRON/FOLIC/DHA 28-975-200
COMBINATION PACKAGE (EA) ORAL 2 TIMES DAILY
Qty: 15 G | Refills: 0 | Status: SHIPPED | OUTPATIENT
Start: 2022-01-26 | End: 2022-01-26

## 2022-01-26 RX ORDER — CLOBETASOL PROPIONATE 0.5 MG/G
CREAM TOPICAL 2 TIMES DAILY
Qty: 60 G | Refills: 1 | Status: SHIPPED | OUTPATIENT
Start: 2022-01-26

## 2022-01-26 NOTE — NURSING NOTE
May we mail normal lab results to your home? No    Patient's preferred contact method if labs are abnormal  Phone call to [phone], Patient would like a phone call and have the lab results discussed with him.     Vicki Varela, CMA

## 2022-01-26 NOTE — PROGRESS NOTES
Nursing Notes:   Vicki Varela CMA  1/26/2022  4:52 PM  Signed  May we mail normal lab results to your home? No    Patient's preferred contact method if labs are abnormal  Phone call to [phone], Patient would like a phone call and have the lab results discussed with him.     Vicki Varela CMA          ASSESSMENT AND PLAN:      Jesus was seen today for fvp care coordination - dme/supplies and derm problem.    Diagnoses and all orders for this visit:    Traumatic amputation of left lower extremity, subsequent encounter (H).  He is requesting prescription for new supplies for his prosthetic.  Print out paper prescriptions were provided, and I am happy to coordinate with still just as needed for his sleeves and shell.  He continues to have lots of frustration about his medical and health limitations since his amputation.  We did some motivational interviewing around what he hopes for next steps, and he is really having difficulty getting past his anger.  I do think he really benefit from behavioral health, but is not interested in this right now.  We will continue to support him as he works to process his current health situation, and the difficulties that happened in the past.  -     Miscellaneous Order for DME - ONLY FOR DME  -     Miscellaneous Order for DME - ONLY FOR DME    Dyshidrotic eczema.  Rash on hands consistent with dyshidrotic eczema.  Refilled the clobetasol cream.  -     clobetasol (TEMOVATE) 0.05 % external cream; Apply topically 2 times daily    Rash.  Has a migratory annular elevated erythematous rash.  I think this is most consistent with a fungal infection, and will treat with terbinafine cream, however he is worried about migratory infection, and there are aspects of this that are concerning for autoimmune disease as well.  Will check a broad range of labs and try some terbinafine cream and see if this improves his symptoms.  -     CBC with Platelets & Differential;  Future  -     CRP inflammation; Future  -     Erythrocyte sedimentation rate auto; Future  -     Discontinue: terbinafine (LAMISIL) 1 % external cream; Apply topically 2 times daily  -     Rheumatoid factor; Future  -     Cyclic Citrullinated Peptide Antibody IgG; Future  -     terbinafine (LAMISIL) 1 % external cream; Apply topically 2 times daily  -     CBC with Platelets & Differential  -     CRP inflammation  -     Erythrocyte sedimentation rate auto  -     Rheumatoid factor  -     Cyclic Citrullinated Peptide Antibody IgG    Erectile dysfunction, unspecified erectile dysfunction type.  He describes sudden loss of erectile function following his amputation.  Does smoke and has a history of hypertension, which is not treated currently.  Cialis and Viagra have not been particularly helpful.  This is likely multifactorial, so we will place a referral to see a urologist to discuss options.  -     Adult Urology Referral; Future    Colon cancer screening.  He agrees to move forward with FIT testing.  This was ordered today.  -     Fecal colorectal cancer screen FIT; Future  -     Fecal colorectal cancer screen FIT    We will continue to discuss ways to improve quality of life with patient.    Patient Instructions   Refill creams for rash.  Use BOTH creams on belly.    Stop in lab to do blood tests for infection and other autoimmune and fatigue problems.     Referral to see the urologist.    We have options for phanton limb pain if you want to try them.  It would be 1 pill per day at night.  Think about if you feel okay about taking a pill everyday for this.     Follow up in 1 month if things are not better.        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 Pt needs a liner for his prosthetic and a foot shell.      Derm Problem     Pt has a rash that moves around.  Pt states he has a cream that seems to help by taking away the itch and resolves it in the spot it is in and then it moves.      Patient presents for follow-up visit and discussion of multiple ongoing problems, which he attributes to the complications from the amputation of his left leg.  He explains to me, that he is had a number of medical problems since the surgery, including difficulty with a migratory rash, weight gain, erectile dysfunction, leg pain and discomfort, as well as multiple economic and lifestyle changes because of this.  Continues to struggle with the loss of function, and feels that the surgery was done in a hasty manner, and was unnecessary.  He attributes all of his problems to this hospitalization, which included the amputation surgery and extended IV antibiotic treatment for blood infection.  He worries that he has not been cured, and that there is still an infection in the bone or traveling around his body, and that this causes his difficulties with rash and skin irritation.    He is requesting refills on his DME supplies for his prosthesis.  Recently had a pin lock that broke, and went to [facility]. e would like print out prescriptions that he can bring there.  Needs to  Nurse and a new foot shell.  He is frustrated by the cost of his medical supplies for his prosthesis.  Lost his job with this amputation, and has been disabled since.  Income has decreased, and need for medical equipment has increased.    He also shares difficulties with rash.  Has been using the clobetasol cream on his hands and they have improved,  but is having difficulty with a rash on his lower abdomen.  Sometimes it has discharge and is weeping.  Can rub on his pants and become irritated.  Has had increased difficulties with rash that migrate around his body, and he worries this is from an infection.    He shares that he now has difficulty with weight gain, and higher blood pressure since the amputation, which were never problems before.  Rarely to never saw a doctor during the 30 years prior to his accident in the procedure.    Also endorses continued difficulty with erectile dysfunction.  This to he describes happening rapidly following his surgery.  Never had difficulty prior.  Now has difficulty both getting and maintaining an erection, and the Cialis and Viagra are not very effective for him.  He is concerned that one of the medications he received in the hospital caused this, or that it was a complication from his surgery and other treatments he received.  Is frustrated by this difficulty, and is interested in other treatment options.    He is noticing some worsening phantom limb pain.  It does not bother him during the day, only at night when his prosthetic is off and he is trying to sleep.  Does not want to have to take a medication regularly.  The pain has been getting worse.    He also endorses difficulty with general fatigue and lack of energy.    He is interested in pursuing legal actions in regards to his original accident, as well as his hospital treatment following the accident.    OBJECTIVE:  Vitals: BP (!) 151/91   Pulse 85   Temp 97.9  F (36.6  C) (Oral)   Resp 16   Wt 114.6 kg (252 lb 9.6 oz)   SpO2 94%   BMI 33.33 kg/m    BMI= Body mass index is 33.33 kg/m .  Objective:    Vitals:  Vitals are reviewed and are within the normal range  Gen: Alert, comfortable, clearly frustrated with his overall health.  Skin: Hands bilaterally show some thickening, but improvement in his eczema.  Along his abdomen, he has multiple annular lesions  with some areas of rubbing, most prominent in the left lower quadrant, consistent with a fungal infection.    Results for orders placed or performed in visit on 01/26/22   CBC with Platelets & Differential     Status: None (In process)    Narrative    The following orders were created for panel order CBC with Platelets & Differential.  Procedure                               Abnormality         Status                     ---------                               -----------         ------                     CBC with platelets and d...[[id number]]                      In process                   Please view results for these tests on the individual orders.           Patient Instructions   Refill creams for rash.  Use BOTH creams on belly.    Stop in lab to do blood tests for infection and other autoimmune and fatigue problems.     Referral to see the urologist.    We have options for phanton limb pain if you want to try them.  It would be 1 pill per day at night.  Think about if you feel okay about taking a pill everyday for this.     Follow up in 1 month if things are not better.        Nika Lang MD

## 2022-01-26 NOTE — LETTER
January 31, 2022       Jesus Presley  [address] [place] [address]        Dear ,    We are writing to inform you of your test results.      Your lab results came back looking normal.  No evidence of infection or inflammation in the blood tests.  This is good news, but we still need to continue to look for why you are not feeling well.  Please call the clinic at [phone] if you have any questions.         Resulted Orders   CRP inflammation   Result Value Ref Range    CRP 0.3 0.0-<0.8 mg/dL   Erythrocyte sedimentation rate auto   Result Value Ref Range    Erythrocyte Sedimentation Rate 7 0 - 15 mm/hr   Rheumatoid factor   Result Value Ref Range    Rheumatoid Factor <15 0 - 30 IU/mL   Cyclic Citrullinated Peptide Antibody IgG   Result Value Ref Range    Cyclic Citrullinated Peptide Antibody IgG <0.5 <=4.9 U/mL   TSH with free T4 reflex   Result Value Ref Range    TSH 2.42 0.30 - 5.00 uIU/mL   CBC with platelets and differential   Result Value Ref Range    WBC Count 8.5 4.0 - 11.0 10e3/uL    RBC Count 5.51 4.40 - 5.90 10e6/uL    Hemoglobin 16.5 13.3 - 17.7 g/dL    Hematocrit 49.9 40.0 - 53.0 %    MCV 91 78 - 100 fL    MCH 29.9 26.5 - 33.0 pg    MCHC 33.1 31.5 - 36.5 g/dL    RDW 12.8 10.0 - 15.0 %    Platelet Count 290 150 - 450 10e3/uL    % Neutrophils 65 %    % Lymphocytes 21 %    % Monocytes 9 %    % Eosinophils 3 %    % Basophils 1 %    % Immature Granulocytes 1 %    NRBCs per 100 WBC 0 <1 /100    Absolute Neutrophils 5.6 1.6 - 8.3 10e3/uL    Absolute Lymphocytes 1.8 0.8 - 5.3 10e3/uL    Absolute Monocytes 0.7 0.0 - 1.3 10e3/uL    Absolute Eosinophils 0.3 0.0 - 0.7 10e3/uL    Absolute Basophils 0.1 0.0 - 0.2 10e3/uL    Absolute Immature Granulocytes 0.0 <=0.4 10e3/uL    Absolute NRBCs 0.0 10e3/uL       If you have any questions or concerns, please call the clinic at the number listed above.       Sincerely,      Nika Lang MD

## 2022-01-26 NOTE — PATIENT INSTRUCTIONS
Refill creams for rash.  Use BOTH creams on belly.    Stop in lab to do blood tests for infection and other autoimmune and fatigue problems.     Referral to see the urologist.    We have options for phanton limb pain if you want to try them.  It would be 1 pill per day at night.  Think about if you feel okay about taking a pill everyday for this.     Follow up in 1 month if things are not better.          22   UROLOGY ADULT REFERRAL   [facility]  [phone] or [phone] (Physician hotline: [phone])  Fax: [phone]  Fax: [phone] (try this one if above fax fails)    Referral, demographics, office notes and labs faxed to [phone].     Colleen Tiwari

## 2022-01-27 LAB — CCP AB SER IA-ACNC: <0.5 U/ML

## 2022-03-22 ENCOUNTER — TELEPHONE (OUTPATIENT)
Dept: FAMILY MEDICINE | Facility: CLINIC | Age: 62
End: 2022-03-22
Payer: COMMERCIAL

## 2022-03-23 NOTE — TELEPHONE ENCOUNTER
Message from Kong noted.     With a signed release, medical records can print out his recent records and lab results from the clinic.     I would recommend that they check with his insurance about coverage, but can just call and request an appointment at [facility], and send in the records for the team there to review.      We would be happy to send them a copy of records with Jesus's permission.     I did meet Kong at patients initial visit with me, but without a release of information, we cannot share information or records with her.      Nika Lang MD    Routed to Colleen, Care Coordinator

## 2022-03-23 NOTE — TELEPHONE ENCOUNTER
03/22/22    Received call from patient's girlfriend Kong. She is not listed as one of patient's emergency contact and was not provided any information about patient. She is requesting an infectious disease and dermatology referrals on patient's behalf and requesting that pt's medical records be released to [facility]. Informed Kong that the usual process for requesting referrals is by appointments so the pt can be evaluated and if he would like for records to be released, pt can come to clinic to sign an STEFFANY. She voiced understanding, but requested a message be sent to Dr. Lang.    Consulted with Dr. Lang who is not aware of Kong and asked for patient to make an appointment to be evaluated for the referrals requested.     Colleen Tiwari

## 2022-03-23 NOTE — TELEPHONE ENCOUNTER
Thanks Dr. Lang,     I called Kong back and we made and appt for next week 03/30. She states she probably will not be coming with him. I put a note for  to verify if pt would like to add her to his emergency contact and for them to fill out an PERICO.     Colleen Tiwari

## 2022-04-25 ENCOUNTER — TRANSFERRED RECORDS (OUTPATIENT)
Dept: HEALTH INFORMATION MANAGEMENT | Facility: CLINIC | Age: 62
End: 2022-04-25
Payer: COMMERCIAL

## 2022-05-11 ENCOUNTER — OFFICE VISIT (OUTPATIENT)
Dept: FAMILY MEDICINE | Facility: CLINIC | Age: 62
End: 2022-05-11
Payer: COMMERCIAL

## 2022-05-11 VITALS
RESPIRATION RATE: 16 BRPM | TEMPERATURE: 97.5 F | DIASTOLIC BLOOD PRESSURE: 88 MMHG | SYSTOLIC BLOOD PRESSURE: 149 MMHG | HEART RATE: 79 BPM | OXYGEN SATURATION: 97 %

## 2022-05-11 DIAGNOSIS — R21 RASH: ICD-10-CM

## 2022-05-11 DIAGNOSIS — L73.9 FOLLICULITIS: Primary | ICD-10-CM

## 2022-05-11 DIAGNOSIS — M79.662 PAIN OF LEFT LOWER LEG: ICD-10-CM

## 2022-05-11 PROCEDURE — 99214 OFFICE O/P EST MOD 30 MIN: CPT | Performed by: STUDENT IN AN ORGANIZED HEALTH CARE EDUCATION/TRAINING PROGRAM

## 2022-05-11 RX ORDER — CLINDAMYCIN HCL 300 MG
300 CAPSULE ORAL 4 TIMES DAILY
Qty: 28 CAPSULE | Refills: 0 | Status: SHIPPED | OUTPATIENT
Start: 2022-05-11 | End: 2022-05-11

## 2022-05-11 RX ORDER — CLINDAMYCIN HCL 300 MG
300 CAPSULE ORAL 4 TIMES DAILY
Qty: 28 CAPSULE | Refills: 0 | Status: SHIPPED | OUTPATIENT
Start: 2022-05-11

## 2022-05-11 NOTE — Clinical Note
Hello Sports Med Colleagues.  This gentleman is a difficult case.  Very suspicious of the medical establishment in general, and has continued pain at the location of his amputation.  Doesn't like taking medications, low health literacy, still angry about losing his leg.  I don't feel like I have the expertise on additional options for him, and would like to send him to an orthopedist to take a look at the leg, and discuss options.  He needs someone with a lot of patience. Do you have any recommendations?  Thank you!

## 2022-05-11 NOTE — PATIENT INSTRUCTIONS
Referral for the dermatologist  We will call to schedule the MRI to look at the knee and leg  Start antibiotics- Take clindamycin 1 pill 4x daily for 1 week for rash.  You can keep using the creams.    Think about if you want to speak with a different orthopedist.  Its up to you, but it might be useful to talk with another orthopedist about other options to help with pain and function.  We would be happy to place that referral for you.          05/23/22    Physical Therapy    [facility]  [address],   [place] [address]  Phone: [phone]  Fax: [phone]    Faxed demographics, referral, and notes to [phone].    Colleen Tiwari      05/24/22   DERMATOLOGY REFERRAL  [facility]     Phone: [phone]  Fax: [phone]     Referral, office notes and demographics faxed to [phone]. They will contact patient to schedule.     Vicky Fabian

## 2022-05-19 ENCOUNTER — TELEPHONE (OUTPATIENT)
Dept: FAMILY MEDICINE | Facility: CLINIC | Age: 62
End: 2022-05-19
Payer: COMMERCIAL

## 2022-05-19 DIAGNOSIS — M79.662 PAIN OF LEFT LOWER LEG: Primary | ICD-10-CM

## 2022-05-19 DIAGNOSIS — S88.912D TRAUMATIC AMPUTATION OF LEFT LOWER EXTREMITY, SUBSEQUENT ENCOUNTER (H): ICD-10-CM

## 2022-05-19 NOTE — TELEPHONE ENCOUNTER
Attempted to call patient this morning to discuss possible PMNR referral for [facility].  Unable to leave a message and no answer.  We will try back later today.  I will place the referral, and anticipate that this would be a good option for him.    Nika Lang MD

## 2022-06-25 ENCOUNTER — HOSPITAL ENCOUNTER (OUTPATIENT)
Dept: MRI IMAGING | Facility: CLINIC | Age: 62
Discharge: HOME | End: 2022-06-25
Attending: STUDENT IN AN ORGANIZED HEALTH CARE EDUCATION/TRAINING PROGRAM | Admitting: STUDENT IN AN ORGANIZED HEALTH CARE EDUCATION/TRAINING PROGRAM
Payer: COMMERCIAL

## 2022-06-25 DIAGNOSIS — M79.662 PAIN OF LEFT LOWER LEG: ICD-10-CM

## 2022-06-25 PROCEDURE — 73722 MRI JOINT OF LWR EXTR W/DYE: CPT | Mod: LT

## 2022-06-25 PROCEDURE — 255N000002 HC RX 255 OP 636: Performed by: STUDENT IN AN ORGANIZED HEALTH CARE EDUCATION/TRAINING PROGRAM

## 2022-06-25 PROCEDURE — A9585 GADOBUTROL INJECTION: HCPCS | Performed by: STUDENT IN AN ORGANIZED HEALTH CARE EDUCATION/TRAINING PROGRAM

## 2022-06-25 RX ORDER — GADOBUTROL 604.72 MG/ML
10 INJECTION INTRAVENOUS ONCE
Status: COMPLETED | OUTPATIENT
Start: 2022-06-25 | End: 2022-06-25

## 2022-06-25 RX ADMIN — GADOBUTROL 10 ML: 604.72 INJECTION INTRAVENOUS at 17:15

## 2022-06-25 NOTE — LETTER
June 27, 2022      Jesus Presley  [address] [place] [address]        Dear Fernandez,    We are writing to inform you of your test results.    MRI is negative for infection, or osteomyelitis but does show neuromas, which are nerve bundles that can form after an amputation.  At our last visit, we discuss further referral, and after consulting with the team, referral was placed for PMR through [facility] at their office in [place].  Patient is wanting a second opinion from [place] and has a complex pain and function hx given his amputation.  I think this is a great fit, and next step should be to help him coordinate this referral.         Resulted Orders   MR Knee Left w Contrast    Narrative    EXAM: MR KNEE LEFT W CONTRAST  LOCATION: [facility]  DATE/TIME: 6/25/2022 4:42 PM    INDICATION: Limb deformity.  COMPARISON: 11/01/2019.  TECHNIQUE: Postgadolinium T1 sequences were obtained.  IV CONTRAST: 10 ml Gadavist.    FINDINGS: Postoperative changes below the knee amputation. There is no evidence for a fluid collection or osteomyelitis along the resection margin. No acute fracture.    No significant knee joint effusion. There is no high-grade cartilage loss or subchondral bone marrow edema.     There is a small stump neuroma measuring 9 mm along the expected location of the common peroneal nerve along the lateral aspect of the fibular head. Additional stump neuroma tibial nerve along the posterior aspect of the knee measuring 13 mm. No   significant change in size compared to the prior exam.    Large field-of-view images of the contralateral right knee are unremarkable.      Impression    IMPRESSION:  1.  No osteomyelitis or abscess along the stump status post below the knee amputation.    2.  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          If you have any questions or concerns, please call the clinic at the number listed above.       Sincerely,      Nika  ARMANDO Lang MD

## 2022-06-27 NOTE — RESULT ENCOUNTER NOTE
Attempted to call patient at [phone] to discuss results.  No answer, and unable to leave a message.     MRI is negative for infection, or osteomyelitis but does show neuromas, which are nerve bundles that can form after an amputation.  At our last visit, we discuss further referral, and after consulting with the team, referral was placed for PMR through [facility] at their office in [place].  Patient is wanting a second opinion from [facility] and has a complex pain and function hx given his amputation.  I think this is a great fit, and next step should be to help him coordinate this referral.      If patient calls back, please help facilitate this referral.      Nika Lang MD

## 2022-07-05 ENCOUNTER — OFFICE VISIT (OUTPATIENT)
Dept: FAMILY MEDICINE | Facility: CLINIC | Age: 62
End: 2022-07-05
Payer: COMMERCIAL

## 2022-07-05 VITALS
OXYGEN SATURATION: 97 % | BODY MASS INDEX: 33.04 KG/M2 | WEIGHT: 250.4 LBS | RESPIRATION RATE: 16 BRPM | TEMPERATURE: 98 F | HEART RATE: 92 BPM | SYSTOLIC BLOOD PRESSURE: 159 MMHG | DIASTOLIC BLOOD PRESSURE: 105 MMHG

## 2022-07-05 DIAGNOSIS — M79.662 PAIN OF LEFT LOWER LEG: Primary | ICD-10-CM

## 2022-07-05 DIAGNOSIS — R21 RASH: ICD-10-CM

## 2022-07-05 DIAGNOSIS — S88.912D TRAUMATIC AMPUTATION OF LEFT LOWER EXTREMITY, SUBSEQUENT ENCOUNTER (H): ICD-10-CM

## 2022-07-05 DIAGNOSIS — L30.1 DYSHIDROTIC ECZEMA: ICD-10-CM

## 2022-07-05 PROCEDURE — 99214 OFFICE O/P EST MOD 30 MIN: CPT | Performed by: STUDENT IN AN ORGANIZED HEALTH CARE EDUCATION/TRAINING PROGRAM

## 2022-07-05 NOTE — PROGRESS NOTES
There are no exam notes on file for this visit.    ASSESSMENT AND PLAN:      Jesus was seen today for results.     Diagnoses and all orders for this visit:    Pain of left lower leg  Traumatic amputation of left lower extremity, subsequent encounter (H)  We discussed his MRI results, which show 2 neuromas, but no evidence of infection or osteomyelitis present.  Labs were also negative for significant inflammation or infection at her last visit.  He is not interested in seeing a physical medicine and rehabilitation doctor, and would like to talk with an orthopedic orthopedist.  He is concerned about the rubbing that the bone is making on the prosthesis, and that this is causing him worsening pain.  Also remains concerned that the rashes he is having are secondary to infection or some type of inflammatory response to retained sutures or complication from his previous surgery.  At his request I have placed an orthopedics referral for [facility] as this is where he would like to go for his care.  -     Orthopedic  Referral; Future    Rash  Dyshidrotic eczema  Rash on hands is consistent with dyshidrotic eczema.  He has some thickening plaque over his right lateral neck, which is unclear etiology to me.  He has some rash over his chest and abdomen which is consistent with a fungal infection.  Has both clotrimazole and clobetasol creams at home that he is using.  He has an upcoming dermatology appointment in August that will hopefully help us better qualify the etiology of these rashes and get him on appropriate treatment.  I do not think this is due to systemic infection and told him this today.  Recommended continue to use his current creams until his upcoming appointment.        Patient Instructions   Keep your dermatology appointment.     Referral placed for orthopedics for [place].      Please call if you have not heard from us in 1 week.        Nika Lang MD    SUBJECTIVE  Jesus Presley is a 61  year old male with past medical history significant for    Patient Active Problem List   Diagnosis     Traumatic amputation of lower extremity (H)     Essential hypertension     Smoker     Benign prostatic hyperplasia with lower urinary tract symptoms, symptom details unspecified     Amputation stump complicated by neuroma (H)     Others present at the visit:  None    Presents for   Chief Complaint   Patient presents with     Results     Pt is here to discuss his MRI results today.      Patient presents for follow-up visit for continued difficulty with leg pain in the area of his previous amputation, as well as difficulties with multiple different rashes.    He shares that summer has been good to him.  He recently bought a new JetSki and has been taking this out on the sink right.  This gives him a lot of deyvi.  He worries about his prosthetic with the JetSki, as he has 1 prosthetic that has a pin and he worries that he could excellently hit this and lose the leg.  Has another option that suctions on, which he is wearing, but it is uncomfortable and hard for him to put on.    We discussed his MRI results.  These show to neuromas at the area at locations of previous nerves that were cut during the amputation.  We discussed that it somewhat shows no sign of infection, breakdown or other concerning findings.  He notes continued pain in the area, and increased difficulty with wearing his prosthetic.  He notes that the bone pushes into the prosthetic and that this is quite painful.  He would like to see if there are surgical options to revise the amputation and shave down the bone as well as address these neuromas.  He would like to see an orthopedist at [place] to do this.    He also expresses concerns about his continued rash.  He has been using the cream about once a day.  Has a rash along his neck, as well as on his belly, as well as on his hands.  The medication helps, but the rash continues to come back.  He does not  feel like the antibiotics that he took helped at all with any of the rashes.  He has an appointment coming up with dermatology but not until August.  He is worried that these are associated with an infection and associated with his previous surgery.    He also relates to me some concerns about retained stitches from his procedure.  He describes thick nylon sutures that were used internally.  When one of them was removed, only a inch of the suture came out.  He wonders if this is causing irritation and pain and could be contributing to his rash.    He also has concerns about continued difficulty with lower extremity edema and discomfort in his right leg, the leg without the prosthetic.  Has had chronic skin changes happening there.  Has increased difficulty using a crutch when he is not using his prosthetic.  This has limited his activities as well.    OBJECTIVE:  Vitals: BP (!) 159/105   Pulse 92   Temp 98  F (36.7  C) (Oral)   Resp 16   Wt 113.6 kg (250 lb 6.4 oz)   SpO2 97%   BMI 33.04 kg/m    BMI= Body mass index is 33.04 kg/m .  Objective:    Vitals:  Vitals are reviewed and are within the normal range  Gen:  Alert, pleasant, no acute distress  Skin: Along his neck there is a elevated erythematous thickened plaque along the the right lateral side of the neck.  He has a couple areas of patches and thickening along his hands bilaterally consistent with dyshidrotic eczema.    MRI results were discussed with the patient in clinic.    EXAM: MR KNEE LEFT W CONTRAST  LOCATION: [facility]  DATE/TIME: 6/25/2022 4:42 PM     INDICATION: Limb deformity.  COMPARISON: 11/01/2019.  TECHNIQUE: Postgadolinium T1 sequences were obtained.  IV CONTRAST: 10 ml Gadavist.     FINDINGS: Postoperative changes below the knee amputation. There is no evidence for a fluid collection or osteomyelitis along the resection margin. No acute fracture.     No significant knee joint effusion. There is no high-grade  cartilage loss or subchondral bone marrow edema.      There is a small stump neuroma measuring 9 mm along the expected location of the common peroneal nerve along the lateral aspect of the fibular head. Additional stump neuroma tibial nerve along the posterior aspect of the knee measuring 13 mm. No   significant change in size compared to the prior exam.     Large field-of-view images of the contralateral right knee are unremarkable.                                                                      IMPRESSION:  1.  No osteomyelitis or abscess along the stump status post below the knee amputation.     2.  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       Patient Instructions   Keep your dermatology appointment.     Referral placed for orthopedics for [place].      Please call if you have not heard from us in 1 week.        Nika Lang MD

## 2022-07-05 NOTE — PATIENT INSTRUCTIONS
Keep your dermatology appointment.     Referral placed for orthopedics for [place].      Please call if you have not heard from us in 1 week.                    07/05/2022 (refax referral 9/26/22)    [facility]  [address] [place] [address]    P: [phone]  F: [phone]    Fax referral, demographic, notes, and any relate imaging to [phone], they will contact pt to schedule.      Thank you.

## 2022-08-05 ENCOUNTER — TRANSFERRED RECORDS (OUTPATIENT)
Dept: HEALTH INFORMATION MANAGEMENT | Facility: CLINIC | Age: 62
End: 2022-08-05

## 2022-08-25 ENCOUNTER — TRANSFERRED RECORDS (OUTPATIENT)
Dept: HEALTH INFORMATION MANAGEMENT | Facility: CLINIC | Age: 62
End: 2022-08-25

## 2022-09-22 ENCOUNTER — TELEPHONE (OUTPATIENT)
Dept: FAMILY MEDICINE | Facility: CLINIC | Age: 62
End: 2022-09-22

## 2022-09-22 NOTE — TELEPHONE ENCOUNTER
[facility] Clinic phone call message- general phone call:    Reason for call: the pt called to ask about a referral that was for the orthopedic doctor and would like a call back he not been contacted for setting up an appointment and would like a call back     Return call needed: Yes    OK to leave a message on voice mail? Yes    Primary language: English      needed? No    Call taken on September 22, 2022 at 9:42 AM by Alli Abdullahi

## 2022-09-29 ENCOUNTER — TELEPHONE (OUTPATIENT)
Dept: FAMILY MEDICINE | Facility: CLINIC | Age: 62
End: 2022-09-29

## 2022-09-29 NOTE — TELEPHONE ENCOUNTER
Dr. Lang,    I had reached out to [facility] for more clarification in regards to why pt's referral was denied. I spoke with Jojo, Jojo was unable to provide me the provider's name that had triage the referral. According that provider he/she were unable to identify any clear indication for revision surgery, recommendation were made for pt to collaborate with Pain Clinic and to work with an Orthopedics Surgeon closer to home.     I has asked for a provider line incase you would like to call in and speak to a provider at [facility]. Jojo was unable to provide me that information as well (slime).    Pt is quite upset with referral being denied by [facility]. Requested for a call back from you to discuss certain things with you.      Please advise.    Thank you,   Vicky Fabian
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
## 2022-09-29 NOTE — TELEPHONE ENCOUNTER
[facility] phone call message- general phone call:    Reason for call: the Pt called to ask for the Dr to call him about his  Referral.  the surgeon is not willing to do the surgery with out talking to Dr torres     Return call needed: Yes    OK to leave a message on voice mail? Yes    Primary language: English      needed? No    Call taken on September 29, 2022 at 11:02 AM by Alli Abdullahi

## 2022-09-30 NOTE — TELEPHONE ENCOUNTER
"Received call back from the orthopedist at [facility].  Spoke with orthopedist about their review and recommendations.  He does not think that surgery would provide any benefit for this patient.  His neuromas/phantom limb pain would not likely be improved with surgery.  He recommended that he be seen at an amputee clinic, with expert PMR docs to adjust the appropriate fit of his prosthesis, discussed pain medication options.  The PMR docs would be better able to assess if he is appropriate for other treatments such as spinal cord stimulation or targeted muscle reinnervation if he is having severe pain.  Would also recommend further mental health/grief support from his experience and losing his leg.    I called Mr. Presley and spoke with him about these recommendations.    Patient shares that when he gets the phantom pain, it is very severe and crippling.  It would be great to have a quick fix for the pain, but if the surgery is not going to help, then he wouldn't want to do surgery.  Doesn't want to have to take medications forever - that is not appealing to him.  He is coming up to be eligible for a new prosthetic, but didn't want to get fitted for a new leg if he would be having surgery.  He would be interested in looking at a better fit or other options for his prosthetic, if a surgery is not on the table.  He does not want to take an every day medication, but would be interested in medications to take with episode for severe pain.  He has concerns that gabapentin, Cymbalta, and Lyrica that he tried previously caused significant side effects and\" made him feel retarded \".  He does not think he would be able to take them regularly, but would be open to options to take with severe pain.    He shares that during the day when his leg is on and he is walking around, he typically feels okay.  Has the most trouble with the phantom limb pain when laying down and trying to sleep at night.  It doesn't happen every night, but it " can last for a long time and be very severe for a couple of hours to a couple of days.  Moving around typically is NOT the time when he has problems.      He wants his next step to be getting in to get fitted for a new leg.  Had the best outcome with his prosthetic at [facility].  He uses this prosthetic most of the time.  Has a suction leg that he uses in the water that he was fitted for at [facility] - he worries about the pin falling out with a situation the water so he uses this type.  He also had a friend who had difficulty from the button on the side breaking.  It sounds like he was eligible for a new leg as of the end of the the summer, and that he feels comfortable reaching out to [facility] to set up an appointment for a new evaluation and fitting.    He will reach out to us if he is needs additional help getting coordinated with the prosthetic specialist.  Does not want to move forward at this time with a PMNR evaluation or pain clinic, but will reach out if pain is worsening and if he is wanting to move forward with additional neck steps.    Nika Lang MD

## 2022-09-30 NOTE — TELEPHONE ENCOUNTER
Outreach call place using the number below to [facility] and spoke with a representative, Kelli.      Sounds like they need further information in order to be able to evaluate the claim and determine next steps.  I was forwarded to the orthopedic line.      Direct phone number for orthopedic line:  [phone]    Spoke with Sai in the Orthopedic department about patient's case.      Sounds like there are a couple of different questions:    --1) Possibly they are still waiting for a doctor to approve, but2) Another message says that this needs scheduling.  Sai was going to check with the department on potential next steps.      I was then transferred to [facility]    I reviewed again.  I am still unable to identify any clear indication for revision surgery.  I recommend collaborating with pain management and orthopedic surgery teams closer to home.      My clarification question for [facility] is 1)  Are they denying the patient because surgery would not be helpful?  Because the potential for improvement in symptoms is low?  Or because they are just not wanting to accept the patient?      She will check with the team and will call me back.     Nika Lang MD

## 2023-05-11 NOTE — RESULT ENCOUNTER NOTE
Jesus Presley-    Your lab results came back looking normal.  No evidence of infection or inflammation in the blood tests.  This is good news, but we still need to continue to look for why you are not feeling well.  Please call the clinic at [phone] if you have any questions.      Nika Lang MD    Please send results to patient.     Topical Sulfur Applications Pregnancy And Lactation Text: This medication is considered safe during pregnancy and breast feeding secondary to limited systemic absorption.

## 2023-06-14 ENCOUNTER — MEDICAL CORRESPONDENCE (OUTPATIENT)
Dept: HEALTH INFORMATION MANAGEMENT | Facility: CLINIC | Age: 63
End: 2023-06-14
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
## 2023-10-12 ENCOUNTER — TRANSFERRED RECORDS (OUTPATIENT)
Dept: HEALTH INFORMATION MANAGEMENT | Facility: CLINIC | Age: 63
End: 2023-10-12
Payer: COMMERCIAL
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
## 2023-11-30 ENCOUNTER — ANCILLARY PROCEDURE (OUTPATIENT)
Dept: GENERAL RADIOLOGY | Facility: CLINIC | Age: 63
End: 2023-11-30
Attending: STUDENT IN AN ORGANIZED HEALTH CARE EDUCATION/TRAINING PROGRAM
Payer: COMMERCIAL

## 2023-11-30 ENCOUNTER — OFFICE VISIT (OUTPATIENT)
Dept: ORTHOPEDICS | Facility: CLINIC | Age: 63
End: 2023-11-30
Payer: COMMERCIAL

## 2023-11-30 VITALS — BODY MASS INDEX: 33.53 KG/M2 | WEIGHT: 253 LBS | HEIGHT: 73 IN

## 2023-11-30 DIAGNOSIS — M25.562 CHRONIC PAIN OF LEFT KNEE: ICD-10-CM

## 2023-11-30 DIAGNOSIS — G89.29 CHRONIC PAIN OF LEFT KNEE: ICD-10-CM

## 2023-11-30 DIAGNOSIS — G89.29 CHRONIC PAIN OF LEFT KNEE: Primary | ICD-10-CM

## 2023-11-30 DIAGNOSIS — M25.562 CHRONIC PAIN OF LEFT KNEE: Primary | ICD-10-CM

## 2023-11-30 PROCEDURE — 99204 OFFICE O/P NEW MOD 45 MIN: CPT | Performed by: STUDENT IN AN ORGANIZED HEALTH CARE EDUCATION/TRAINING PROGRAM

## 2023-11-30 PROCEDURE — 73590 X-RAY EXAM OF LOWER LEG: CPT | Mod: TC | Performed by: RADIOLOGY

## 2023-11-30 NOTE — PATIENT INSTRUCTIONS
[facility]   [address] [place] [address], [address]  [place] [address] [address] [place] [address]   Appointments: [phone] Appointments: [phone]   Fax: [phone] Fax: [phone]       1. Chronic pain of left knee        Call my office with any questions or concerns, [phone].
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
## 2023-12-05 ENCOUNTER — TELEPHONE (OUTPATIENT)
Dept: ANESTHESIOLOGY | Facility: CLINIC | Age: 63
End: 2023-12-05
Payer: COMMERCIAL

## 2023-12-05 DIAGNOSIS — L73.9 FOLLICULITIS: ICD-10-CM

## 2023-12-05 NOTE — TELEPHONE ENCOUNTER
Reached out to pt, and scheduled with Dr Aguilar per pt's request on January 25th at 11:00 [place],

## 2023-12-05 NOTE — TELEPHONE ENCOUNTER
[facility] Call Center    Phone Message    May a detailed message be left on voicemail: No    Reason for Call: Appointment Intake    Referring Provider Name: Hao Mohamud  Diagnosis and/or Symptoms:     Chronic pain of left knee [M25.562, G89.29]   And Amputee    Pt called to schedule, conflicting information between PMR and Pain management referral as pain management states [facility] which is PMR but put on pain management referral note originally had said closed do not schedule. Please review the referrals and call back to schedule and put in note if Pt is suppose to be scheduled with both PMR and Pain    Action Taken: Message routed to:  [facility]    Travel Screening: Not Applicable

## 2024-01-25 ENCOUNTER — LAB (OUTPATIENT)
Dept: LAB | Facility: CLINIC | Age: 64
End: 2024-01-25
Payer: COMMERCIAL

## 2024-01-25 ENCOUNTER — OFFICE VISIT (OUTPATIENT)
Dept: PHYSICAL MEDICINE AND REHAB | Facility: CLINIC | Age: 64
End: 2024-01-25
Payer: COMMERCIAL

## 2024-01-25 DIAGNOSIS — M62.838 MUSCLE SPASM: ICD-10-CM

## 2024-01-25 DIAGNOSIS — M25.562 CHRONIC PAIN OF LEFT KNEE: ICD-10-CM

## 2024-01-25 DIAGNOSIS — G89.29 CHRONIC PAIN OF LEFT KNEE: ICD-10-CM

## 2024-01-25 DIAGNOSIS — T87.30: Primary | ICD-10-CM

## 2024-01-25 DIAGNOSIS — T87.30: ICD-10-CM

## 2024-01-25 PROCEDURE — 99205 OFFICE O/P NEW HI 60 MIN: CPT | Performed by: STUDENT IN AN ORGANIZED HEALTH CARE EDUCATION/TRAINING PROGRAM

## 2024-01-25 PROCEDURE — 99417 PROLNG OP E/M EACH 15 MIN: CPT | Performed by: STUDENT IN AN ORGANIZED HEALTH CARE EDUCATION/TRAINING PROGRAM

## 2024-01-25 PROCEDURE — 83735 ASSAY OF MAGNESIUM: CPT

## 2024-01-25 PROCEDURE — 36415 COLL VENOUS BLD VENIPUNCTURE: CPT

## 2024-01-25 PROCEDURE — 80048 BASIC METABOLIC PNL TOTAL CA: CPT

## 2024-01-25 ASSESSMENT — PAIN SCALES - GENERAL: PAINLEVEL: MILD PAIN (3)

## 2024-01-25 NOTE — NURSING NOTE
Chief Complaint   Patient presents with    RECHECK     Amputation 6 years ago - still having issues.      Theresa Dimas, CMA

## 2024-01-25 NOTE — PATIENT INSTRUCTIONS
I will order a new prosthesis, which should help with your left leg pain due to the poor fit.  Please apply the skin lotion you have every night before bed after you take off the prosthesis.  Remember to slowly add socks throughout the day as you're wearing the socket, especially if your leg is swollen in the morning.  Please get the labs as soon as possible so I can see if your magnesium is affecting your spasms.  Please establish with a primary care provider to address the blood vessel issues with your right leg.    We will talk about physical therapy on your next visit.    Amputation Resources:     W[employer] Your Toes  [email or web address] (Facebook group as well)   Main PH: [phone]  , Mark Carbajal PH: [phone], is available by phone/text 7 days/week (recommend you contact Mark directly)     &    Amputation Coalition   www.amputee-coalition.org   PH: [phone]  National agency where you can find support groups and find local resources.     Support Groups:   For more information, contact Nadeen Morton PH: [phone], Email: [email or web address]     Balanced Grief--A NON Therapeutic Look at Grief   1st and 3rd Tuesdays on Zoom @ 10-11:00 am central   Grief is normal learn how to thrive with limb loss-- join the learning and discussion.     [facility] Amputee Community   Tuesdays on Zoom @ 7-8:00 pm central time    Come learn from various professions related to thriving with limb loss.
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
## 2024-01-26 LAB
ANION GAP SERPL CALCULATED.3IONS-SCNC: 11 MMOL/L (ref 7–15)
BUN SERPL-MCNC: 14.8 MG/DL (ref 8–23)
CALCIUM SERPL-MCNC: 9.2 MG/DL (ref 8.8–10.2)
CHLORIDE SERPL-SCNC: 108 MMOL/L (ref 98–107)
CREAT SERPL-MCNC: 1.24 MG/DL (ref 0.67–1.17)
DEPRECATED HCO3 PLAS-SCNC: 22 MMOL/L (ref 22–29)
EGFRCR SERPLBLD CKD-EPI 2021: 65 ML/MIN/1.73M2
GLUCOSE SERPL-MCNC: 109 MG/DL (ref 70–99)
MAGNESIUM SERPL-MCNC: 2 MG/DL (ref 1.7–2.3)
POTASSIUM SERPL-SCNC: 4.4 MMOL/L (ref 3.4–5.3)
SODIUM SERPL-SCNC: 141 MMOL/L (ref 135–145)

## 2024-02-26 NOTE — PROGRESS NOTES
There are no exam notes on file for this visit.    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      Presents for   Chief Complaint   Patient presents with     Cyst     Pt is here to have a cyst removed today.     Medication Reconciliation     Complete.      Patient presents today for removal of 2 cysts from his right upper back.  The first cyst is 1.5 cm in diameter has been there for many years.  It has on 2 occasions grown large, and has exuded a whitish thick substance.  One time he had a niece help squeeze the material out, the other time it opened up on his own.  Last episode of draining was a couple of months ago.  It is still somewhat irritable and draining a small amount of discharge.  The second cyst is 1cm in diameter is just medial to it.  Smaller in size.  This 1 has not opened up, but he is worried that he could have similar problems with the spot.    He also has questions with 2 other Derm issues.  The first is he has noticed some thickening, itching, and irritation on his hands.  Most prominent on the dorsal side of the hand between the thumb and the first finger.  Some areas over the remainder of the dorsum of the hand and on the knuckles as well.  He has tried some routine moisturizer without improvement.  Is wondering if I have recommendations for the spot.    He has continued difficulty with rash on his chest.  Has not been using the cream very regularly.  Did notice improvement when he did use it.    Finally, he is got a spot on the base of his palm.  Was told by previous provider that this is a wart, and  atorvastatin (LIPITOR) 20 MG tablet     LOV: 01-29-24  Upcoming appt: [id number]  Per PCP office note on 10-10-23 Patient have hyperlipidemia and takes Lipitor.  Last refill on 2/8/23, 90 day supply with 3 refills.     RX for Atorvastatin 20 mg is pending for PCP approval.   has had it frozen with cryotherapy a couple of different times.  He still feels a bump in the area, and it has developed a callus over it.  He is wonder if there is alternative treatments for this.    Also has questions about the Covid shot.  He is quite concerned about it.  Has not received flu shots in the past because he does not like putting unknown substances into his body.  Worries about anything that will be injected.  Does not feel comfortable moving forward with the Covid shot.    He has noted some improvement in his erectile dysfunction symptoms with use of Viagra.  Still struggling to achieve a prolonged erection.  Would like to try Cialis instead.    OBJECTIVE:  Vitals: BP (!) 141/87   Pulse 95   Temp 97.7  F (36.5  C) (Oral)   Resp 16   SpO2 97%   BMI= There is no height or weight on file to calculate BMI.  Vitals:  Vitals are reviewed and are within the normal range.  Blood pressure is mildly elevated but improved from his last visit.  Gen:  Alert, pleasant, no acute distress.  Patient has difficulty keeping his bandanna up over his face.  Skin: 2 cysts present on the right upper back.  Medial 1 is intact, lateral one has opened and has small amount of drainage.  Bilateral hands with thickened lichenified patches over the dorsum between the thumb and first finger.  Some areas of irritation around other fingers as well.  On the base of the right hand, he has a callus present along the entire heel of the hand.  Area is slightly more prominent in the mid palmar region, with thickened callus present there.    Procedure: Consent was obtained.  Area was anesthetized with 1% lidocaine with epi.  Cleaned with Betadine.  A vertical incision was done on the medial lesion.  Large amounts of sebaceous type fluid was expressed.  The capsule of the cyst was removed using a curved clamp.  Sides were then reapproximated and sutured using a 4-0 Ethilon suture.  Similarly, the lateral side it was treated with a  vertical incision.  Minimal expression of contents from this lesion.  Capsular fragments were removed and area was rinsed.  Sides were then also reapproximated using 4-0 Ethilon suture.  3 sutures were placed in each excisional area.  Blood loss was minimal.  Patient tolerated well.  Instructed him to keep his sutures in place for at least a week and return for removal.  Keep areas clean and dry.    Procedure: Using a scalpel, the callus was debrided over the right palmar surface.  I did not see evidence of punctation or other sign that this was a plantar wart.  Area was then treated with cryotherapy x3.  Discussed with patient that this may just be callus from irritation in the area, but hopefully this will prevent regrowth of callus in the future.      ASSESSMENT AND PLAN:      Jesus was seen today for cyst and medication reconciliation.    Diagnoses and all orders for this visit:    Dyshidrotic eczema.  Rash on hands consistent with dyshidrotic eczema.  Recommended clobetasol twice daily, including covering with gloves overnight.  -     clobetasol (TEMOVATE) 0.05 % external cream; Apply topically 2 times daily    Erectile dysfunction, unspecified erectile dysfunction type.  -     tadalafil (CIALIS) 20 MG tablet; Take 1 tablet (20 mg) by mouth daily as needed (erectile dysfunction)    Common wart.  Thickened lesion over wrist previously treated as a wart.  May have resolved already with previous treatments, but significant callus still present.  Procedure completed to remove callus and treat with cryotherapy.  Discussed other options of over-the-counter treatment if this is not effective.  -     CRYOTHERAPY ACNE    Sebaceous cyst.  See procedure note above.  2 benign-appearing sebaceous cyst removed from his right upper back.  -     EXC BENIGN SKIN LESION TRUNK/ARM/LEG 2.1-3.0 CM    We discussed COVID-19 shot and my recommended station that he move forward with this.  He is not interested at this time.  We will  continue to discuss.    Follow up in 1-2 months for recheck rash.      Nika Lang MD

## 2024-03-18 ENCOUNTER — DOCUMENTATION ONLY (OUTPATIENT)
Dept: PHYSICAL MEDICINE AND REHAB | Facility: CLINIC | Age: 64
End: 2024-03-18
Payer: COMMERCIAL

## 2024-03-18 DIAGNOSIS — S88.912D TRAUMATIC AMPUTATION OF LEFT LOWER EXTREMITY, SUBSEQUENT ENCOUNTER (H): Primary | ICD-10-CM

## 2024-04-19 NOTE — TELEPHONE ENCOUNTER
Patient given the info. He will call and see if either service will accommodate out of town and if so, how far. Once he gets this info, he will call us back so we can make referral to endo out of town.    Please advise.

## 2024-04-25 ENCOUNTER — OFFICE VISIT (OUTPATIENT)
Dept: PHYSICAL MEDICINE AND REHAB | Facility: CLINIC | Age: 64
End: 2024-04-25
Payer: COMMERCIAL

## 2024-04-25 VITALS — DIASTOLIC BLOOD PRESSURE: 86 MMHG | SYSTOLIC BLOOD PRESSURE: 142 MMHG

## 2024-04-25 DIAGNOSIS — T87.30: ICD-10-CM

## 2024-04-25 DIAGNOSIS — S88.912D TRAUMATIC AMPUTATION OF LEFT LOWER EXTREMITY, SUBSEQUENT ENCOUNTER (H): Primary | ICD-10-CM

## 2024-04-25 PROCEDURE — 99214 OFFICE O/P EST MOD 30 MIN: CPT | Performed by: STUDENT IN AN ORGANIZED HEALTH CARE EDUCATION/TRAINING PROGRAM

## 2024-04-25 ASSESSMENT — PAIN SCALES - GENERAL: PAINLEVEL: MILD PAIN (3)

## 2024-04-25 NOTE — PATIENT INSTRUCTIONS
We will continue to adjust your temporary socket until the fit is better in all ways than your previous socket and does not result in skin issues and increased pain.  We will talk about therapy and possible alternative pain relief solutions when the socket is finalized or all attempts at fitting have been exhausted.    We discussed the technique of targeted muscle reinnervation to potentially improve the pain from your neuroma.  This is a surgical technique that may or may not be appropriate for you, but I'd prefer to finish fitting this socket first before consideration of these advanced pain techniques.  If you are interested, I am not qualified to discuss this with you, and I could refer you to [facility] if needed.  I will try to get them to email you to establish contact, and you can choose if you want to follow up.    We also have a pain department that can do peripheral nerve stimulation and other techniques other than medications that can help you.  We can discuss this on a further visit.
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
## 2024-06-27 ENCOUNTER — OFFICE VISIT (OUTPATIENT)
Dept: PHYSICAL MEDICINE AND REHAB | Facility: CLINIC | Age: 64
End: 2024-06-27
Payer: COMMERCIAL

## 2024-06-27 VITALS — DIASTOLIC BLOOD PRESSURE: 86 MMHG | SYSTOLIC BLOOD PRESSURE: 164 MMHG

## 2024-06-27 DIAGNOSIS — S88.912D TRAUMATIC AMPUTATION OF LEFT LOWER EXTREMITY, SUBSEQUENT ENCOUNTER (H): Primary | ICD-10-CM

## 2024-06-27 PROCEDURE — 99215 OFFICE O/P EST HI 40 MIN: CPT | Performed by: STUDENT IN AN ORGANIZED HEALTH CARE EDUCATION/TRAINING PROGRAM

## 2024-06-27 ASSESSMENT — PAIN SCALES - GENERAL: PAINLEVEL: NO PAIN (0)

## 2024-06-27 NOTE — PATIENT INSTRUCTIONS
Continue using your prosthesis for everyday activities.  You can use over the counter pain relief medication especially on days when you're doing a lot of walking or transfers.    If you need prescription medication or adjustments to your prosthesis, feel free to set up an appointment with us.
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
## 2024-10-21 ENCOUNTER — TELEPHONE (OUTPATIENT)
Dept: INTERNAL MEDICINE | Facility: CLINIC | Age: 64
End: 2024-10-21
Payer: COMMERCIAL

## 2024-10-21 NOTE — TELEPHONE ENCOUNTER
Tried calling to reschedule patient. Dr. Perez is not seeing new patients and length of appointment is not long enough. Please reschedule with a provider taking establish care visits.    Mini Burks CMA

## 2024-10-22 NOTE — TELEPHONE ENCOUNTER
LMTCB for daughter Concha. Dad needs to be scheduled with a provider who is taking new patients. If there were any acute/urgent needs he could still see him today for those and we could get him followed up with a provider in the future that is excepting new patients.    Mini Burks CMA

## 2025-01-07 NOTE — PROGRESS NOTES
There are no exam notes on file for this visit.    ASSESSMENT AND PLAN:      Jesus was seen today for referral.  Continues to have difficulty with chronic pain at the site of his previous amputation, as well as multiple areas of rash and skin changes.  He is concerned that he is not receiving adequate treatment and that he may have a potential infection.    We discussed referrals to [facility], and that these need to come from a specialty provider for per his request.  Recommend we start with a local specialist and they can coordinate with [facility] if this is necessary and appropriate.    He should continue with the clobetasol on his hands.  Continue with the terbinafine on his lower abdomen as this has been helpful, and I will give oral treatment with clindamycin for what appears to be a folliculitis on his legs.  Referral placed for dermatologist to discuss other options and more definitive treatments going forward.    As for the leg, continues to cause pain.  He is very concerned about starting occasions as he has had side effects to gabapentin, Cymbalta, and Lyrica in the past.  I recommended we reconnect with an orthopedist to take a look at the stump, provide medication recommendations for treatment of his pain, and will do an MRI.  I will reach out to our sports medicine colleagues for ideas and recommendations here.    Diagnoses and all orders for this visit:    Folliculitis  -     Discontinue: clindamycin (CLEOCIN) 300 MG capsule; Take 1 capsule (300 mg) by mouth 4 times daily  -     Adult Dermatology Referral; Future  -     clindamycin (CLEOCIN) 300 MG capsule; Take 1 capsule (300 mg) by mouth 4 times daily    Pain of left lower leg  -     MR Knee Left w Contrast; Future    Rash  -     Adult Dermatology Referral; Future        Patient Instructions   Referral for the dermatologist  We will call to schedule the MRI to look at the knee and leg  Start antibiotics- Take clindamycin 1 pill 4x daily for 1 week for  Occupational Therapy    Patient not seen in therapy.     Re-attempt plan: tomorrow    OT orders acknowledged. Pt completed PT eval, collaborated with PT and safe transfer techniques have been established for RN staff pre-op -- hold OT eval until post op tomorrow 1/8       OBJECTIVE                          Documented in the chart in the following areas: Assessment/Plan.      Therapy procedure time and total treatment time can be found documented on the Time Entry flowsheet   "rash.  You can keep using the creams.    Think about if you want to speak with a different orthopedist.  Its up to you, but it might be useful to talk with another orthopedist about other options to help with pain and function.  We would be happy to place that referral for you.        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      Patient presents today for follow-up of multiple concerns.  He expresses frustration because he still having difficulty with pain in his leg, stump, and difficulties with rashes.  He is noting increased difficulty with doing activities.  Used to be able to be up on crutches for a long period of time.  Gets discomfort when he is on his prosthetic for too long and feels like something is sticking into his leg.  Has found the creams for his rash are helpful and gives him some temporary relief, but the rash keeps coming back.  He does not feel like you are fixing the problem fully.  In general he still feels like his body \"does not feel right.  \".  Has just not felt well ever since his accident and amputation, and worries that he still has a potential infection or other etiology that is causing the rashes, skin changes, and difficulty with healing.  He feels a lot of anger about his amputation and the current situation.  Expresses significant distrust in office is the medical establishment.  Worries that he still has an infection, that he has stitches still present inside his stump, " "and that medications that we are giving him are not helpful and actually make him feel worse.    Today he is endorsing rashes in multiple locations.  He continues to have a rash on his hands that is thick and somewhat rough.  Has a rash on his lower abdomen  Feels Emily, and has some drainage at times.  Also has a rash extending down his legs most notably on the left thigh which is the leg that he has the amputation.  These are small red bumps that are irritating and painful.  Has some on his right leg as well.  He continues to notice difficulties with swelling.  Note swelling in the stump area with redness, warmth and irritation, as well as on his right lower leg around the ankle foot and lower leg.  Describes this is looking like his foot is always dirty, and has discomfort in this foot and leg as well.    He endorses worsening phantom pains in his left leg.  These are most bothersome at night when he is trying to sleep.  He describes trying gabapentin, Cymbalta, and Lyrica, but all of these made him nonfunctional.  He felt like they \"wiped his brain clean \", and he will unable to think and concentrate.  Now he is having pain every night, within 5 to 10 minutes of laying down will kick in, and makes it difficult to sleep.    He and his partner have talked about getting additional evaluation, and would like to do this down at [place].  He is wondering what it would look like to get a referral to go there.  Is interested in seeing another specialist, but has concerns about how he will be treated and what will happen because of his past experiences with the amputation.    OBJECTIVE:  Vitals: BP (!) 149/88   Pulse 79   Temp 97.5  F (36.4  C) (Oral)   Resp 16   SpO2 97%   BMI= There is no height or weight on file to calculate BMI.  Objective:    Vitals:  Vitals are reviewed and are within the normal range  Gen:  Alert, pleasant, no acute distress  Upper extremities: He has a thickened erythematous rash over the " dorsum of both hands extending onto the fingers consistent with dyshidrotic eczema.  Abdomen: On his left lower abdomen he has a beefy erythematous rash with some drainage consistent with a contact dermatitis versus fungal infection with overlying irritation and rubbing.  His left leg is his below the knee amputation.  Along the leg he has elevated erythematous papules with some mild discharge extending down the thigh, below the amputation he is got evidence of previous scar with some callus buildup, has moderate nonpitting edema with mild erythema no significant warmth but there are areas where it looks like it is quite irritated.  He has similar papular rashes on the right leg as well.    No results found for any visits on 05/11/22.        Patient Instructions   Referral for the dermatologist  We will call to schedule the MRI to look at the knee and leg  Start antibiotics- Take clindamycin 1 pill 4x daily for 1 week for rash.  You can keep using the creams.    Think about if you want to speak with a different orthopedist.  Its up to you, but it might be useful to talk with another orthopedist about other options to help with pain and function.  We would be happy to place that referral for you.        Nika Lang MD

## 2025-06-30 ENCOUNTER — OFFICE VISIT (OUTPATIENT)
Dept: INTERNAL MEDICINE | Facility: CLINIC | Age: 65
End: 2025-06-30
Payer: COMMERCIAL

## 2025-06-30 VITALS
HEIGHT: 73 IN | RESPIRATION RATE: 16 BRPM | DIASTOLIC BLOOD PRESSURE: 80 MMHG | TEMPERATURE: 98.1 F | OXYGEN SATURATION: 97 % | HEART RATE: 84 BPM | SYSTOLIC BLOOD PRESSURE: 148 MMHG | WEIGHT: 247 LBS | BODY MASS INDEX: 32.74 KG/M2

## 2025-06-30 DIAGNOSIS — S76.301A RIGHT HAMSTRING INJURY, INITIAL ENCOUNTER: ICD-10-CM

## 2025-06-30 DIAGNOSIS — R73.03 PREDIABETES: ICD-10-CM

## 2025-06-30 DIAGNOSIS — L29.9 PRURITUS: Primary | ICD-10-CM

## 2025-06-30 LAB
ERYTHROCYTE [DISTWIDTH] IN BLOOD BY AUTOMATED COUNT: 12.9 % (ref 10–15)
EST. AVERAGE GLUCOSE BLD GHB EST-MCNC: 123 MG/DL
HBA1C MFR BLD: 5.9 % (ref 0–5.6)
HCT VFR BLD AUTO: 49.1 % (ref 40–53)
HGB BLD-MCNC: 16.8 G/DL (ref 13.3–17.7)
MCH RBC QN AUTO: 29.8 PG (ref 26.5–33)
MCHC RBC AUTO-ENTMCNC: 34.2 G/DL (ref 31.5–36.5)
MCV RBC AUTO: 87 FL (ref 78–100)
PLATELET # BLD AUTO: 270 10E3/UL (ref 150–450)
RBC # BLD AUTO: 5.63 10E6/UL (ref 4.4–5.9)
WBC # BLD AUTO: 9.4 10E3/UL (ref 4–11)

## 2025-06-30 PROCEDURE — 80053 COMPREHEN METABOLIC PANEL: CPT | Performed by: INTERNAL MEDICINE

## 2025-06-30 PROCEDURE — 3077F SYST BP >= 140 MM HG: CPT | Performed by: INTERNAL MEDICINE

## 2025-06-30 PROCEDURE — 36415 COLL VENOUS BLD VENIPUNCTURE: CPT | Performed by: INTERNAL MEDICINE

## 2025-06-30 PROCEDURE — 85027 COMPLETE CBC AUTOMATED: CPT | Performed by: INTERNAL MEDICINE

## 2025-06-30 PROCEDURE — 3079F DIAST BP 80-89 MM HG: CPT | Performed by: INTERNAL MEDICINE

## 2025-06-30 PROCEDURE — 84443 ASSAY THYROID STIM HORMONE: CPT | Performed by: INTERNAL MEDICINE

## 2025-06-30 PROCEDURE — 83036 HEMOGLOBIN GLYCOSYLATED A1C: CPT | Performed by: INTERNAL MEDICINE

## 2025-06-30 PROCEDURE — 99214 OFFICE O/P EST MOD 30 MIN: CPT | Performed by: INTERNAL MEDICINE

## 2025-06-30 RX ORDER — HYDROXYZINE HYDROCHLORIDE 25 MG/1
25 TABLET, FILM COATED ORAL EVERY 6 HOURS PRN
Qty: 30 TABLET | Refills: 0 | Status: SHIPPED | OUTPATIENT
Start: 2025-06-30

## 2025-06-30 NOTE — PATIENT INSTRUCTIONS
I would recommend seeing an orthopedic specialist regarding your hamstring injury if it is not improving with rest, applying ice and using ibuprofen.  You can go to [facility] without needing an appointment

## 2025-06-30 NOTE — PROGRESS NOTES
"  Assessment & Plan     Pruritus  64-year-old male who has been experiencing itching on his back for the past 2 weeks.  There has been no rash.  Itching can be severe.  It is limited to his back and not involving the rest of his body.  On exam, skin is unremarkable.  No new laundry detergents or soaps.  No exposure to bedbugs.  No new medications.  We discussed that it could be a metabolic cause for the itching and we will proceed with workup checking CBC, CMP and TSH.  In the meantime he can use hydroxyzine 25 mg every 6 hours as needed  - CBC with platelets; Future  - Comprehensive metabolic panel (BMP + Alb, Alk Phos, ALT, AST, Total. Bili, TP); Future  - TSH with free T4 reflex; Future  - hydrOXYzine HCl (ATARAX) 25 MG tablet; Take 1 tablet (25 mg) by mouth every 6 hours as needed for itching.    Right hamstring injury, initial encounter  Injured his right hamstring last week when climbing up a hill.  Ongoing pain despite using ibuprofen.  He is asking for stronger pain medication.  I would not recommend that but would suggest seeing an orthopedic specialist if his symptoms are not improving over the next week with rest, ice and ibuprofen.    Prediabetes  A1c has not been checked for some time and he is concerned about the possibility of diabetes.  - Hemoglobin A1c; Future              BMI  Estimated body mass index is 32.59 kg/m  as calculated from the following:    Height as of this encounter: 1.854 m (6' 1\").    Weight as of this encounter: 112 kg (247 lb).         Follow-up  Return in about 4 weeks (around 7/28/2025) for Establish with new primary care provider.    Kamila Lee is a 64 year old, presenting for the following health issues: See assessment plan for details  Pruritis (Itching on mainly on back.) and Muscle Pain (Pulled hamstring two weeks ago)        6/30/2025     5:15 PM   Additional Questions   Roomed by Tania WHITEHEAD   Accompanied by krystal     History of Present Illness       Reason for visit:  " "Itching,pulled hamstring,  Symptom onset:  1-2 weeks ago  Symptom intensity:  Severe  Symptom progression:  Staying the same  Had these symptoms before:  Yes  Has tried/received treatment for these symptoms:  No   He is taking medications regularly.                  Review of Systems        Objective    BP (!) 148/80   Pulse 84   Temp 98.1  F (36.7  C)   Resp 16   Ht 1.854 m (6' 1\")   Wt 112 kg (247 lb)   SpO2 97%   BMI 32.59 kg/m    Body mass index is 32.59 kg/m .  Physical Exam   Skin without any rashes or lesions        Signed Electronically by: Moreno Perez MD    "

## 2025-07-01 ENCOUNTER — RESULTS FOLLOW-UP (OUTPATIENT)
Dept: INTERNAL MEDICINE | Facility: CLINIC | Age: 65
End: 2025-07-01
Payer: COMMERCIAL

## 2025-07-01 LAB
ALBUMIN SERPL BCG-MCNC: 4.3 G/DL (ref 3.5–5.2)
ALP SERPL-CCNC: 100 U/L (ref 40–150)
ALT SERPL W P-5'-P-CCNC: 37 U/L (ref 0–70)
ANION GAP SERPL CALCULATED.3IONS-SCNC: 11 MMOL/L (ref 7–15)
AST SERPL W P-5'-P-CCNC: 33 U/L (ref 0–45)
BILIRUB SERPL-MCNC: 0.3 MG/DL
BUN SERPL-MCNC: 17.9 MG/DL (ref 8–23)
CALCIUM SERPL-MCNC: 9.2 MG/DL (ref 8.8–10.4)
CHLORIDE SERPL-SCNC: 109 MMOL/L (ref 98–107)
CREAT SERPL-MCNC: 1.17 MG/DL (ref 0.67–1.17)
EGFRCR SERPLBLD CKD-EPI 2021: 69 ML/MIN/1.73M2
GLUCOSE SERPL-MCNC: 94 MG/DL (ref 70–99)
HCO3 SERPL-SCNC: 22 MMOL/L (ref 22–29)
POTASSIUM SERPL-SCNC: 4.5 MMOL/L (ref 3.4–5.3)
PROT SERPL-MCNC: 7 G/DL (ref 6.4–8.3)
SODIUM SERPL-SCNC: 142 MMOL/L (ref 135–145)
TSH SERPL DL<=0.005 MIU/L-ACNC: 2.12 UIU/ML (ref 0.3–4.2)

## 2025-07-09 ENCOUNTER — TELEPHONE (OUTPATIENT)
Dept: INTERNAL MEDICINE | Facility: CLINIC | Age: 65
End: 2025-07-09
Payer: COMMERCIAL

## 2025-07-09 NOTE — TELEPHONE ENCOUNTER
Calling patient, relayed provider message as pt has not received the letter.     Education provided on prediabetes.     I assist with scheduling to establish with a provider per pt preference.     He was appreciative. No further questions at this time.     I resend results letter also.     Shiraz BASURTO RN

## 2025-07-09 NOTE — TELEPHONE ENCOUNTER
FYI - Status Update    Who is Calling: patient    Update: patient would like to discuss lab results from 6/30/2025    Does caller want a call/response back: Yes     Okay to leave a detailed message?: Yes at Home number on file [phone] (home)

## 2025-07-28 ENCOUNTER — ANCILLARY PROCEDURE (OUTPATIENT)
Dept: GENERAL RADIOLOGY | Facility: CLINIC | Age: 65
End: 2025-07-28
Attending: STUDENT IN AN ORGANIZED HEALTH CARE EDUCATION/TRAINING PROGRAM
Payer: COMMERCIAL

## 2025-07-28 ENCOUNTER — OFFICE VISIT (OUTPATIENT)
Dept: FAMILY MEDICINE | Facility: CLINIC | Age: 65
End: 2025-07-28
Payer: COMMERCIAL

## 2025-07-28 VITALS
WEIGHT: 244.2 LBS | HEART RATE: 84 BPM | HEIGHT: 73 IN | RESPIRATION RATE: 14 BRPM | BODY MASS INDEX: 32.37 KG/M2 | OXYGEN SATURATION: 97 % | DIASTOLIC BLOOD PRESSURE: 100 MMHG | SYSTOLIC BLOOD PRESSURE: 160 MMHG | TEMPERATURE: 97.8 F

## 2025-07-28 DIAGNOSIS — L29.9 PRURITUS: ICD-10-CM

## 2025-07-28 DIAGNOSIS — I10 ESSENTIAL HYPERTENSION: ICD-10-CM

## 2025-07-28 DIAGNOSIS — N39.41 URGE INCONTINENCE OF URINE: ICD-10-CM

## 2025-07-28 DIAGNOSIS — G54.6 PHANTOM LIMB SYNDROME WITH PAIN (H): Primary | ICD-10-CM

## 2025-07-28 DIAGNOSIS — R73.03 PREDIABETES: ICD-10-CM

## 2025-07-28 DIAGNOSIS — R35.0 BENIGN PROSTATIC HYPERPLASIA WITH URINARY FREQUENCY: ICD-10-CM

## 2025-07-28 DIAGNOSIS — N40.1 BENIGN PROSTATIC HYPERPLASIA WITH URINARY FREQUENCY: ICD-10-CM

## 2025-07-28 DIAGNOSIS — R35.89 POLYURIA: ICD-10-CM

## 2025-07-28 DIAGNOSIS — N52.1 ERECTILE DYSFUNCTION DUE TO DISEASES CLASSIFIED ELSEWHERE: ICD-10-CM

## 2025-07-28 DIAGNOSIS — M25.512 CHRONIC LEFT SHOULDER PAIN: ICD-10-CM

## 2025-07-28 DIAGNOSIS — Z12.5 ENCOUNTER FOR SCREENING FOR MALIGNANT NEOPLASM OF PROSTATE: ICD-10-CM

## 2025-07-28 DIAGNOSIS — S88.912S: ICD-10-CM

## 2025-07-28 DIAGNOSIS — S76.311A HAMSTRING MUSCLE STRAIN, RIGHT, INITIAL ENCOUNTER: ICD-10-CM

## 2025-07-28 DIAGNOSIS — Z87.891 PERSONAL HISTORY OF TOBACCO USE: ICD-10-CM

## 2025-07-28 DIAGNOSIS — G89.29 CHRONIC LEFT SHOULDER PAIN: ICD-10-CM

## 2025-07-28 DIAGNOSIS — Z13.6 SCREENING FOR AAA (ABDOMINAL AORTIC ANEURYSM): ICD-10-CM

## 2025-07-28 DIAGNOSIS — Z12.11 SCREEN FOR COLON CANCER: ICD-10-CM

## 2025-07-28 PROBLEM — T87.30: Status: RESOLVED | Noted: 2019-12-20 | Resolved: 2025-07-28

## 2025-07-28 LAB
ALBUMIN UR-MCNC: 30 MG/DL
APPEARANCE UR: CLEAR
BILIRUB UR QL STRIP: NEGATIVE
COLOR UR AUTO: YELLOW
GLUCOSE UR STRIP-MCNC: NEGATIVE MG/DL
HGB UR QL STRIP: NEGATIVE
KETONES UR STRIP-MCNC: NEGATIVE MG/DL
LEUKOCYTE ESTERASE UR QL STRIP: NEGATIVE
NITRATE UR QL: NEGATIVE
PH UR STRIP: 7 [PH] (ref 5–8)
PSA SERPL DL<=0.01 NG/ML-MCNC: 10.3 NG/ML (ref 0–4.5)
RBC #/AREA URNS AUTO: NORMAL /HPF
SP GR UR STRIP: 1.02 (ref 1–1.03)
UROBILINOGEN UR STRIP-ACNC: 0.2 E.U./DL
WBC #/AREA URNS AUTO: NORMAL /HPF

## 2025-07-28 PROCEDURE — 81001 URINALYSIS AUTO W/SCOPE: CPT | Performed by: STUDENT IN AN ORGANIZED HEALTH CARE EDUCATION/TRAINING PROGRAM

## 2025-07-28 PROCEDURE — G0103 PSA SCREENING: HCPCS | Mod: GZ | Performed by: STUDENT IN AN ORGANIZED HEALTH CARE EDUCATION/TRAINING PROGRAM

## 2025-07-28 PROCEDURE — 73030 X-RAY EXAM OF SHOULDER: CPT | Mod: TC | Performed by: STUDENT IN AN ORGANIZED HEALTH CARE EDUCATION/TRAINING PROGRAM

## 2025-07-28 PROCEDURE — 36415 COLL VENOUS BLD VENIPUNCTURE: CPT | Performed by: STUDENT IN AN ORGANIZED HEALTH CARE EDUCATION/TRAINING PROGRAM

## 2025-07-28 NOTE — PATIENT INSTRUCTIONS
Lung Cancer Screening   Frequently Asked Questions  If you are at high-risk for lung cancer, getting screened with low-dose computed tomography (LDCT) every year can help save your life. This handout offers answers to some of the most common questions about lung cancer screening. If you have other questions, please call [phone] ([phone]).     What is it?  Lung cancer screening uses special X-ray technology to create an image of your lung tissue. The exam is quick and easy and takes less than 10 seconds. We don t give you any medicine or use any needles. You can eat before and after the exam. You don t need to change your clothes as long as the clothing on your chest doesn t contain metal. But, you do need to be able to hold your breath for at least 6 seconds during the exam.    What is the goal of lung cancer screening?  The goal of lung cancer screening is to save lives. Many times, lung cancer is not found until a person starts having physical symptoms. Lung cancer screening can help detect lung cancer in the earliest stages when it may be easier to treat.    Who should be screened for lung cancer?  We suggest lung cancer screening for anyone who is at high-risk for lung cancer. You are in the high-risk group if you:      are between the ages of 55 and 79, and    have smoked at least 1 pack of cigarettes a day for 20 or more years, and    still smoke or have quit within the past 15 years.    However, if you have a new cough or shortness of breath, you should talk to your doctor before being screened.    Why does it matter if I have symptoms?  Certain symptoms can be a sign that you have a condition in your lungs that should be checked and treated by your doctor. These symptoms include fever, chest pain, a new or changing cough, shortness of breath that you have never felt before, coughing up blood or unexplained weight loss. Having any of these symptoms can greatly affect the results of lung  cancer screening.       Should all smokers get an LDCT lung cancer screening exam?  It depends. Lung cancer screening is for a very specific group of men and women who have a history of heavy smoking over a long period of time (see  Who should be screened for lung cancer  above).  I am in the high-risk group, but have been diagnosed with cancer in the past. Is LDCT lung cancer screening right for me?  In some cases, you should not have LDCT lung screening, such as when your doctor is already following your cancer with CT scan studies. Your doctor will help you decide if LDCT lung screening is right for you.  Do I need to have a screening exam every year?  Yes. If you are in the high-risk group described earlier, you should get an LDCT lung cancer screening exam every year until you are 79, or are no longer willing or able to undergo screening and possible procedures to diagnose and treat lung cancer.  How effective is LDCT at preventing death from lung cancer?  Studies have shown that LDCT lung cancer screening can lower the risk of death from lung cancer by 20 percent in people who are at high-risk.  What are the risks?  There are some risks and limitations of LDCT lung cancer screening. We want to make sure you understand the risks and benefits, so please let us know if you have any questions. Your doctor may want to talk with you more about these risks.    Radiation exposure: As with any exam that uses radiation, there is a very small increased risk of cancer. The amount of radiation in LDCT is small--about the same amount a person would get from a mammogram. Your doctor orders the exam when he or she feels the potential benefits outweigh the risks.    False negatives: No test is perfect, including LDCT. It is possible that you may have a medical condition, including lung cancer, that is not found during your exam. This is called a false negative result.    False positives and more testing: LDCT very often finds  something in the lung that could be cancer, but in fact is not. This is called a false positive result. False positive tests often cause anxiety. To make sure these findings are not cancer, you may need to have more tests. These tests will be done only if you give us permission. Sometimes patients need a treatment that can have side effects, such as a biopsy. For more information on false positives, see  What can I expect from the results?     Findings not related to lung cancer: Your LDCT exam also takes pictures of areas of your body next to your lungs. In a very small number of cases, the CT scan will show an abnormal finding in one of these areas, such as your kidneys, adrenal glands, liver or thyroid. This finding may not be serious, but you may need more tests. Your doctor can help you decide what other tests you may need, if any.  What can I expect from the results?  About 1 out of 4 LDCT exams will find something that may need more tests. Most of the time, these findings are lung nodules. Lung nodules are very small collections of tissue in the lung. These nodules are very common, and the vast majority--more than 97 percent--are not cancer (benign). Most are normal lymph nodes or small areas of scarring from past infections.  But, if a small lung nodule is found to be cancer, the cancer can be cured more than 90 percent of the time. To know if the nodule is cancer, we may need to get more images before your next yearly screening exam. If the nodule has suspicious features (for example, it is large, has an odd shape or grows over time), we will refer you to a specialist for further testing.  Will my doctor also get the results?  Yes. Your doctor will get a copy of your results.  Is it okay to keep smoking now that there s a cancer screening exam?  No. Tobacco is one of the strongest cancer-causing agents. It causes not only lung cancer, but other cancers and cardiovascular (heart) diseases as well. The damage  caused by smoking builds over time. This means that the longer you smoke, the higher your risk of disease. While it is never too late to quit, the sooner you quit, the better.  Where can I find help to quit smoking?  The best way to prevent lung cancer is to stop smoking. If you have already quit smoking, congratulations and keep it up! For help on quitting smoking, please call [employer] at 6-486-QUITNOW (1-715.752.1286) or the American [employer] Society at 1-641.915.9890 to find local resources near you.  One-on-one health coaching:  If you d prefer to work individually with a health care provider on tobacco cessation, we offer:      Medication Therapy Management:  Our specially trained pharmacists work closely with you and your doctor to help you quit smoking.  Call [phone] or [phone] (toll free).

## 2025-07-28 NOTE — PROGRESS NOTES
"  {PROVIDER CHARTING PREFERENCE:783126}    Kamila Lee is a 64 year old, presenting for the following health issues:  Establish Care (Prostate screening, frequent urination, itching all over body, fall that happened 3 months ago - left shoulder still feels tender/painful, pulled hamstrings 2 months ago, surgery and ED, discuss left leg/bone infection.)        7/28/2025     1:59 PM   Additional Questions   Roomed by ROYA HOUSTON     HPI      {MA/LPN/RN Pre-Provider Visit Orders- hCG/UA/Strep (Optional):254080}  {SUPERLIST (Optional):586802}  {additonal problems for provider to add (Optional):559764}    {ROS Picklists (Optional):523253}      Objective    BP (!) 160/100 (BP Location: Right arm, Patient Position: Sitting, Cuff Size: Adult Regular)   Pulse 84   Temp 97.8  F (36.6  C)   Resp 14   Ht 1.854 m (6' 1\")   Wt 110.8 kg (244 lb 3.2 oz)   SpO2 97%   BMI 32.22 kg/m    Body mass index is 32.22 kg/m .  Physical Exam   {Exam List (Optional):942129}    {Diagnostic Test Results (Optional):994393}        Signed Electronically by: Shawn Allred MD  {Email feedback regarding this note to [email or web address]   :507298}  "

## 2025-07-28 NOTE — PROGRESS NOTES
Lung Cancer Screening Shared Decision Making Visit     Jesus Presley, a 64 year old male, is eligible for lung cancer screening    History   Smoking Status     Every Day     Types: Cigarettes   Smokeless Tobacco     Never       I have discussed with patient the risks and benefits of screening for lung cancer with low-dose CT.     The risks include:    radiation exposure: one low dose chest CT has as much ionizing radiation as about 15 chest x-rays, or 6 months of background radiation living in [place]      false positives: most findings/nodules are NOT cancer, but some might still require additional diagnostic evaluation, including biopsy    over-diagnosis: some slow growing cancers that might never have been clinically significant will be detected and treated unnecessarily     The benefit of early detection of lung cancer is contingent upon adherence to annual screening or more frequent follow up if indicated.     Furthermore, to benefit from screening, Jesus must be willing and able to undergo diagnostic procedures, if indicated. Although no specific guide is available for determining severity of comorbidities, it is reasonable to withhold screening in patients who have greater mortality risk from other diseases.     We did discuss that the best way to prevent lung cancer is to not smoke.    Some patients may value a numeric estimation of lung cancer risk when evaluating if lung cancer screening is right for them, here is one calculator:    ShouldIScreen

## 2025-07-30 ENCOUNTER — HOSPITAL ENCOUNTER (OUTPATIENT)
Dept: ULTRASOUND IMAGING | Facility: CLINIC | Age: 65
Discharge: HOME | End: 2025-07-30
Attending: STUDENT IN AN ORGANIZED HEALTH CARE EDUCATION/TRAINING PROGRAM
Payer: COMMERCIAL

## 2025-07-30 DIAGNOSIS — Z13.6 SCREENING FOR AAA (ABDOMINAL AORTIC ANEURYSM): ICD-10-CM

## 2025-07-30 PROBLEM — I77.89 ENLARGEMENT OF ABDOMINAL AORTA: Status: ACTIVE | Noted: 2025-07-30

## 2025-07-30 PROCEDURE — 76775 US EXAM ABDO BACK WALL LIM: CPT

## 2025-07-31 ENCOUNTER — THERAPY VISIT (OUTPATIENT)
Dept: PHYSICAL THERAPY | Facility: REHABILITATION | Age: 65
End: 2025-07-31
Attending: STUDENT IN AN ORGANIZED HEALTH CARE EDUCATION/TRAINING PROGRAM
Payer: COMMERCIAL

## 2025-07-31 DIAGNOSIS — M25.512 CHRONIC LEFT SHOULDER PAIN: ICD-10-CM

## 2025-07-31 DIAGNOSIS — S76.311A HAMSTRING MUSCLE STRAIN, RIGHT, INITIAL ENCOUNTER: ICD-10-CM

## 2025-07-31 DIAGNOSIS — M54.41 CHRONIC RIGHT-SIDED LOW BACK PAIN WITH RIGHT-SIDED SCIATICA: Primary | ICD-10-CM

## 2025-07-31 DIAGNOSIS — G89.29 CHRONIC LEFT SHOULDER PAIN: ICD-10-CM

## 2025-07-31 DIAGNOSIS — G89.29 CHRONIC RIGHT-SIDED LOW BACK PAIN WITH RIGHT-SIDED SCIATICA: Primary | ICD-10-CM

## 2025-07-31 PROBLEM — R35.0 BENIGN PROSTATIC HYPERPLASIA WITH URINARY FREQUENCY: Status: ACTIVE | Noted: 2019-04-02

## 2025-07-31 PROBLEM — N39.41 URGE INCONTINENCE OF URINE: Status: ACTIVE | Noted: 2025-07-31

## 2025-07-31 PROBLEM — N52.1 ERECTILE DYSFUNCTION DUE TO DISEASES CLASSIFIED ELSEWHERE: Status: ACTIVE | Noted: 2025-07-31

## 2025-07-31 PROBLEM — R35.89 POLYURIA: Status: ACTIVE | Noted: 2025-07-31

## 2025-07-31 ASSESSMENT — ACTIVITIES OF DAILY LIVING (ADL)
ROLLING OVER IN BED: MODERATE DIFFICULTY
SPORTS_SCORE(%): 0
PUTTING ON SOCKS AND SHOES: MODERATE DIFFICULTY
HOW_WOULD_YOU_RATE_YOUR_CURRENT_LEVEL_OF_FUNCTION_DURING_YOUR_USUAL_ACTIVITIES_OF_DAILY_LIVING_FROM_0_TO_100_WITH_100_BEING_YOUR_LEVEL_OF_FUNCTION_PRIOR_TO_YOUR_HIP_PROBLEM_AND_0_BEING_THE_INABILITY_TO_PERFORM_ANY_OF_YOUR_USUAL_DAILY_ACTIVITIES?: 25
JUMPING: UNABLE TO DO
WALKING_FOR_APPROXIMATELY_10_MINUTES: MODERATE DIFFICULTY
LIGHT_TO_MODERATE_WORK: MODERATE DIFFICULTY
STANDING FOR 15 MINUTES: MODERATE DIFFICULTY
DEEP SQUATTING: EXTREME DIFFICULTY
SPORTS_TOTAL_ITEM_SCORE: 0
ADL_TOTAL_ITEM_SCORE: INCOMPLETE
WALKING_INITIALLY: MODERATE DIFFICULTY
GOING DOWN 1 FLIGHT OF STAIRS: MODERATE DIFFICULTY
RUNNING_ONE_MILE: UNABLE TO DO
SWINGING_OBJECTS_LIKE_A_GOLF_CLUB: EXTREME DIFFICULTY
GOING UP 1 FLIGHT OF STAIRS: MODERATE DIFFICULTY
LOW_IMPACT_ACTIVITIES_LIKE_FAST_WALKING: MODERATE DIFFICULTY
HOS_ADL_HIGHEST_POTENTIAL_SCORE: 64
CUTTING/LATERAL_MOVEMENTS: EXTREME DIFFICULTY
ADL_SCORE(%): INCOMPLETE
HOS_ADL_ITEM_SCORE_TOTAL: 31
WALKING_APPROXIMATELY_10_MINUTES: MODERATE DIFFICULTY
GETTING INTO AND OUT OF AN AVERAGE CAR: MODERATE DIFFICULTY
ADL_COUNT: 16
ADL_HIGHEST_POTENTIAL_SCORE: 64
GETTING_INTO_AND_OUT_OF_A_BATHTUB: MODERATE DIFFICULTY
STEPPING_UP_AND_DOWN_CURBS: MODERATE DIFFICULTY
SPORTS_COUNT: 9
PUTTING_ON_SOCKS_AND_SHOES: MODERATE DIFFICULTY
STEPPING UP AND DOWN CURBS: MODERATE DIFFICULTY
GETTING_INTO_AND_OUT_OF_A_BATHTUB: MODERATE DIFFICULTY
ABILITY_TO_PARTICIPATE_IN_YOUR_DESIRED_SPORT_AS_LONG_AS_YOU_WOULD_LIKE: EXTREME DIFFICULTY
PLEASE_INDICATE_YOR_PRIMARY_REASON_FOR_REFERRAL_TO_THERAPY:: HIP
SITTING_FOR_15_MINUTES: MODERATE DIFFICULTY
HOS_ADL_SCORE(%): 48.44
HOW_WOULD_YOU_RATE_YOUR_CURRENT_LEVEL_OF_FUNCTION_DURING_YOUR_USUAL_ACTIVITIES_OF_DAILY_LIVING_FROM_0_TO_100_WITH_100_BEING_YOUR_LEVEL_OF_FUNCTION_PRIOR_TO_YOUR_HIP_PROBLEM_AND_0_BEING_THE_INABILITY_TO_PERFORM_ANY_OF_YOUR_USUAL_DAILY_ACTIVITIES?: 25
WALKING_INITIALLY: MODERATE DIFFICULTY
GETTING_INTO_AND_OUT_OF_AN_AVERAGE_CAR: MODERATE DIFFICULTY
TWISTING/PIVOTING ON INVOLVED LEG: MODERATE DIFFICULTY
RECREATIONAL ACTIVITIES: MODERATE DIFFICULTY
STARTING_AND_STOPPING_QUICKLY: EXTREME DIFFICULTY
ABILITY_TO_PERFORM_ACTIVITY_WITH_YOUR_NORMAL_TECHNIQUE: EXTREME DIFFICULTY
SITTING FOR 15 MINUTES: MODERATE DIFFICULTY
TWISTING/PIVOTING_ON_INVOLVED_LEG: MODERATE DIFFICULTY
ROLLING_OVER_IN_BED: MODERATE DIFFICULTY
HOW_WOULD_YOU_RATE_YOUR_CURRENT_LEVEL_OF_FUNCTION_DURING_YOUR_SPORTS_RELATED_ACTIVITIES_FROM_0_TO_100_WITH_100_BEING_YOUR_LEVEL_OF_FUNCTION_PRIOR_TO_YOUR_HIP_PROBLEM_AND_0_BEING_THE_INABILITY_TO_PERFORM_ANY_OF_YOUR_USUAL_DAILY_ACTIVITIES?: 25
RECREATIONAL_ACTIVITIES: MODERATE DIFFICULTY
LANDING: EXTREME DIFFICULTY
HEAVY_WORK: MODERATE DIFFICULTY
LIGHT_TO_MODERATE_WORK: MODERATE DIFFICULTY
WALKING_15_MINUTES_OR_GREATER: MODERATE DIFFICULTY
HEAVY_WORK: MODERATE DIFFICULTY
DEEP_SQUATTING: EXTREME DIFFICULTY
WALKING_DOWN_STEEP_HILLS: MODERATE DIFFICULTY
WALKING_DOWN_STEEP_HILLS: MODERATE DIFFICULTY
GOING_UP_1_FLIGHT_OF_STAIRS: MODERATE DIFFICULTY
SPORTS_HIGHEST_POTENTIAL_SCORE: 36
HOW_WOULD_YOU_RATE_YOUR_CURRENT_LEVEL_OF_FUNCTION?: SEVERELY ABNORMAL
GOING_DOWN_1_FLIGHT_OF_STAIRS: MODERATE DIFFICULTY
WALKING_15_MINUTES_OR_GREATER: MODERATE DIFFICULTY
STANDING_FOR_15_MINUTES: MODERATE DIFFICULTY

## 2025-07-31 NOTE — PROGRESS NOTES
PHYSICAL THERAPY EVALUATION  Type of Visit: Evaluation       Fall Risk Screen:  Have you fallen 2 or more times in the past year?: No  Have you fallen and had an injury in the past year?: Yes      Subjective         Presenting condition or subjective complaint: pulled hamstring    The patient reports to therapy with a chief complaint of R hamstring injury. Hurt this when climbing up a  hill. Patient has a hx of Left leg amputation. The patient also experiences L shoulder pain. Fells over 3 months ago that is still bothering his shoulder. Hamstring strain x2 months from hill walking. Reports having pulled his hamstring years ago. Pain goes from his butt cheek all the way down the leg.     Date of onset: 07/28/25 (MD visit but pain x2 months)    Relevant medical history: Hearing problems; High blood pressure   Dates & types of surgery: [id number] amputation of leftleg    Prior diagnostic imaging/testing results: Other none of the other answers fit xray shoulder:   No fracture or malalignment. Mild acromioclavicular degenerative change. Glenohumeral joint spaces are maintained.   Prior therapy history for the same diagnosis, illness or injury: No      Living Environment  Social support: Alone   Type of home: House   Stairs to enter the home: Yes 3 Is there a railing: No     Ramp: No   Stairs inside the home: Yes 10 Is there a railing: Yes     Help at home: None  Equipment owned: Straight Cane; Crutches; Grab bars     Employment: Yes    Hobbies/Interests:      Patient goals for therapy: end the pain    Pain assessment: See objective evaluation for additional pain details     Objective   HIP EVALUATION  PAIN: Pain Level at Rest: 5/10  Pain Level with Use: 7/10  Pain Location: hip, knee, and ankle  POSTURE: Standing Posture: Avoids WB on R LE   ROM: Lumbar flexion: reaches knees with increased R LE stretch through hamstring, lumbar extension: max limited-does not increase R LE pain but rather L LE amp discomfort. Hip flexion:  100 deg anterior R hip discomfort     STRENGTH: R LE myotomes: hip flexion 5/5, knee extension: 4+/5, Knee flexion 5/5, ankle DF 5/5, PF: 5/5   LE FLEXIBILITY: decreased hamstring mobility   SPECIAL TESTS: positive R sciatic n. Tension   PALPATION: tender to R gluteal, R hamstring     Assessment & Plan   CLINICAL IMPRESSIONS  Medical Diagnosis: Right hamstring injury, subsequent encounter    Treatment Diagnosis: Right leg radicular symptoms, sciatic n.   Impression/Assessment: Patient is a 64 year old male with hamstring complaints.  The following significant findings have been identified: Pain, Decreased ROM/flexibility, Decreased joint mobility, Decreased strength, Inflammation, Impaired muscle performance, Decreased activity tolerance, and Impaired posture. These impairments interfere with their ability to perform self care tasks, work tasks, recreational activities, household chores, driving , household mobility, and community mobility as compared to previous level of function.     Clinical Decision Making (Complexity):  Clinical Presentation: Stable/Uncomplicated  Clinical Presentation Rationale: based on medical and personal factors listed in PT evaluation  Clinical Decision Making (Complexity): Low complexity    PLAN OF CARE  Treatment Interventions:  Modalities: Cryotherapy, Cupping, Dry Needling, E-stim, Hot Pack  Interventions: Gait Training, Manual Therapy, Neuromuscular Re-education, Therapeutic Activity, Therapeutic Exercise, Self-Care/Home Management    Long Term Goals     PT Goal 1  Goal Identifier: Prolonged Positioning.  Goal Description: The patient will be able to sustain a position, either sitting or standing x30 minutes with appropriate posture and pain <3/10.  Rationale: to maximize safety and independence with performance of ADLs and functional tasks;to maximize safety and independence with self cares  Target Date: 10/28/25  PT Goal 2  Goal Identifier: Ambulation  Goal Description: The patient  will be able to ambulate x30 minutes with pain <3/10.  Rationale: to maximize safety and independence with performance of ADLs and functional tasks;to maximize safety and independence within the home;to maximize safety and independence with self cares  Target Date: 10/28/25      Frequency of Treatment: 1x/week tapering to 1x every 2 weeks  Duration of Treatment: 90 days    Recommended Referrals to Other Professionals:   Education Assessment:   Learner/Method: Patient  Education Comments: Eager to participate in therapy. Patient demonstrates understanding of plan of care and consents to treatment.    Risks and benefits of evaluation/treatment have been explained.   Patient/Family/caregiver agrees with Plan of Care.     Evaluation Time:     PT Eval, Low Complexity Minutes (51756): 15       Signing Clinician: Cyndi Siddiqi, PT        [facility]                                                                                   OUTPATIENT PHYSICAL THERAPY      PLAN OF TREATMENT FOR OUTPATIENT REHABILITATION   Patient's Last Name, First Name, Jesus Rueda YOB: 1960   Provider's Name   [facility]   Medical Record No.  [id number]     Onset Date: 07/28/25 (MD visit but pain x2 months)  Start of Care Date: 07/31/25     Medical Diagnosis:  Right hamstring injury, subsequent encounter      PT Treatment Diagnosis:  Right leg radicular symptoms, sciatic n. Plan of Treatment  Frequency/Duration: 1x/week tapering to 1x every 2 weeks/ 90 days    Certification date from 07/31/25 to 10/28/25         See note for plan of treatment details and functional goals     Cyndi Siddiqi, PT                         I CERTIFY THE NEED FOR THESE SERVICES FURNISHED UNDER        THIS PLAN OF TREATMENT AND WHILE UNDER MY CARE     (Physician attestation of this document indicates review and certification of the therapy plan).               Referring Provider:  Shawn Allred    Initial Assessment  See Epic Evaluation- Start of Care Date: 07/31/25

## 2025-08-04 ENCOUNTER — PATIENT OUTREACH (OUTPATIENT)
Dept: CARE COORDINATION | Facility: CLINIC | Age: 65
End: 2025-08-04
Payer: COMMERCIAL

## 2025-08-08 PROBLEM — D12.6 ADENOMATOUS POLYP OF COLON: Status: ACTIVE | Noted: 2025-08-08

## 2025-08-09 ENCOUNTER — HEALTH MAINTENANCE LETTER (OUTPATIENT)
Age: 65
End: 2025-08-09

## 2025-10-20 ENCOUNTER — PRE VISIT (OUTPATIENT)
Dept: UROLOGY | Facility: CLINIC | Age: 65
End: 2025-10-20

## (undated) DEVICE — CANNULA MONOPOLAR CVD 50ML 20GA 0406-630-015

## (undated) DEVICE — GLOVE PROTEXIS MICRO 8.0  2D73PM80

## (undated) DEVICE — COVER ULTRASOUND PROBE W/GEL FLEXI-FEEL 6"X58" LF  25-FF658

## (undated) DEVICE — SYR 03ML LL W/O NDL 309657

## (undated) DEVICE — NDL BLUNT 18GA 1" W/O FILTER 305181

## (undated) DEVICE — PREP CHLORAPREP W/ORANGE TINT 10.5ML 260715

## (undated) DEVICE — GLOVE PROTEXIS POWDER FREE SMT 7.0  2D72PT70X

## (undated) DEVICE — DRSG GAUZE 4X4" 2187

## (undated) DEVICE — PEN MARKING SKIN TYCO DEVON DUAL TIP 31145868

## (undated) DEVICE — PAIN PACK

## (undated) DEVICE — SYR 10ML LL W/O NDL 302995

## (undated) DEVICE — NDL 25GA 2"  8881200441

## (undated) DEVICE — SYR 05ML LL W/O NDL

## (undated) DEVICE — NDL COUNTER 20CT 31142493

## (undated) DEVICE — Device

## (undated) DEVICE — GLOVE ESTEEM POWDER FREE SMT 7.0  2D72PT70

## (undated) DEVICE — NDL CANNULA SOLOPLEX STIM 21GX100MM 001187-77

## (undated) DEVICE — LINEN TOWEL PACK X5 5464

## (undated) DEVICE — DRAPE BACK TABLE  44X90" 8377

## (undated) RX ORDER — FENTANYL CITRATE 50 UG/ML
INJECTION, SOLUTION INTRAMUSCULAR; INTRAVENOUS
Status: DISPENSED
Start: 2018-06-01